# Patient Record
Sex: FEMALE | Race: WHITE | NOT HISPANIC OR LATINO | Employment: FULL TIME | URBAN - METROPOLITAN AREA
[De-identification: names, ages, dates, MRNs, and addresses within clinical notes are randomized per-mention and may not be internally consistent; named-entity substitution may affect disease eponyms.]

---

## 2024-10-18 ENCOUNTER — TELEPHONE (OUTPATIENT)
Dept: OBSTETRICS AND GYNECOLOGY | Facility: CLINIC | Age: 24
End: 2024-10-18

## 2024-10-18 NOTE — TELEPHONE ENCOUNTER
Caller: Edwina Suarez    Relationship: Self    Best call back number: [phone]      Who are you requesting to speak with (clinical staff, DR. LUEVANO      What was the call regarding: PATIENT ADVISED THAT SHE GOT A CALL FROM [email or web address] ( [phone]) WHICH WAS WHERE SHE HAD HER FIRST U/S DONE.  ADVISED HER THAT SHE HAS FLUID BUILD UP OUTSIDE OF HER UTERUS AND TO FORWARD THIS INFORMATION TO OBGYN.

## 2024-10-18 NOTE — TELEPHONE ENCOUNTER
Spoke with Edwina and asked if she has a copy of the ultrasound and she states she only received a call from [employer]. I called to get a copy of the report and they are closed. I told Edwina that once I receive the report I would send a message to the provider that is on call. Since they are not open and I am unable to get a copy of the report I am sending a message to Dr. Dane CHNE to see if there are any recommendations she can give without the u/s report.

## 2024-10-23 ENCOUNTER — TELEPHONE (OUTPATIENT)
Dept: OBSTETRICS AND GYNECOLOGY | Facility: CLINIC | Age: 24
End: 2024-10-23

## 2024-10-23 NOTE — TELEPHONE ENCOUNTER
Caller: Edwina Suarez    Relationship: Self    Best call back number: [phone]    What is the best time to reach you: ANY    Who are you requesting to speak with (clinical staff, provider,  specific staff member): CLINICAL     What was the call regarding: PT STATES SHE IS REQUESTING A CALL BACK TO DISCUSS HER U/S

## 2024-10-24 NOTE — TELEPHONE ENCOUNTER
Patient returned call and stated that she brought the US report into the office. Will get scanned into the chart.

## 2024-10-24 NOTE — TELEPHONE ENCOUNTER
Called [employer] and they can not release the US unless the patient goes in and signs a release or picks up the report herself. Tried to call patient and was disconnected.

## 2024-10-24 NOTE — TELEPHONE ENCOUNTER
Hub staff attempted to follow warm transfer process and was unsuccessful     Caller: Edwina Suarez    Relationship to patient: Self    Best call back number: [phone] CALL ANYTIME. IT IS OKAY TO LVM.     Patient is needing: OB PATIENT IS REQUESTING A CALL BACK TO DISCUSS ULTRASOUND FROM [facility]. PATIENT CALLED [employer] ON 10/21/24 AND REQUESTED RESULTS FAXED. PATIENT CAN NOT RECALL FAX NUMBER PROVIDED.     PATIENT IS GOING TO CALL [employer] TODAY TO CONFIRM FAX WENT THROUGH ON 10/21/24 AND REQUEST RE FAXED TO OFFICE [phone].     ADDITIONAL TELEPHONE ENCOUNTER IN CHART FROM 10/18/24 REGARDING ULTRASOUND CONCERNS.

## 2024-11-05 NOTE — PROGRESS NOTES
"GYN Visit    Chief Complaint   Patient presents with    Follow-up     Follow up viability US        HPI:   24 y.o. LMP: Patient's last menstrual period was 2024 (exact date).  Patient presents today for confirmation of pregnancy.  She has taken a prenatal vitamin.  She denies any bleeding or cramping.  She is having nausea and vomiting.  She is wanting medication to help.    Social History     Substance and Sexual Activity   Sexual Activity Yes    Partners: Male    Birth control/protection: None       History: PMHx, Meds, Allergies, PSHx, Social Hx, and POBHx all reviewed and updated.  Last Completed Pap Smear       This patient has no relevant Health Maintenance data.            PHYSICAL EXAM:  /67   Pulse 87   Ht 160 cm (63\")   Wt 64 kg (141 lb)   LMP 2024 (Exact Date)   Breastfeeding No   BMI 24.98 kg/m²   General- NAD, alert and oriented, appropriate  Psych- Normal mood  Respiratory- No labored breathing  Abdomen- Soft, non distended, non tender    Extremities- No edema  Skin- No lesions, no rashes      ASSESSMENT AND PLAN:  Diagnoses and all orders for this visit:    1. Pregnancy with inconclusive fetal viability, single or unspecified fetus (Primary)    2. Nausea and vomiting, unspecified vomiting type  -     vitamin B-6 (PYRIDOXINE) 25 MG tablet; Take 1 tablet by mouth Daily.  Dispense: 30 tablet; Refill: 1  -     doxylamine (UNISOM) 25 MG tablet; Take 1 tablet by mouth At Night As Needed for Nausea.  Dispense: 30 tablet; Refill: 1  -     promethazine (PHENERGAN) 25 MG tablet; Take 1 tablet by mouth Every 6 (Six) Hours As Needed for Nausea or Vomiting.  Dispense: 30 tablet; Refill: 1    Ultrasound reviewed showing single live intrauterine pregnancy at 11 weeks 1 day gestation, HELIO 2025  Continue prenatal vitamins  New OB visit in 3 weeks  FIRST TRIMESTER precautions provided- return to office/ER for pain and/or vaginal bleeding.      Follow Up:  Return in about 3 weeks " (around 11/27/2024) for New OB.        Alba Henao, DO  11/06/2024    [facility]  [address] [place] [address]  Dept: [phone]  Dept Fax: [phone]  Loc: [phone]  Loc Fax: [phone]

## 2024-11-06 ENCOUNTER — OFFICE VISIT (OUTPATIENT)
Dept: OBSTETRICS AND GYNECOLOGY | Facility: CLINIC | Age: 24
End: 2024-11-06
Payer: COMMERCIAL

## 2024-11-06 VITALS
HEIGHT: 63 IN | HEART RATE: 87 BPM | WEIGHT: 141 LBS | DIASTOLIC BLOOD PRESSURE: 67 MMHG | SYSTOLIC BLOOD PRESSURE: 109 MMHG | BODY MASS INDEX: 24.98 KG/M2

## 2024-11-06 DIAGNOSIS — R11.2 NAUSEA AND VOMITING, UNSPECIFIED VOMITING TYPE: ICD-10-CM

## 2024-11-06 DIAGNOSIS — O36.80X0 PREGNANCY WITH INCONCLUSIVE FETAL VIABILITY, SINGLE OR UNSPECIFIED FETUS: Primary | ICD-10-CM

## 2024-11-06 RX ORDER — DIPHENHYDRAMINE HYDROCHLORIDE 25 MG/1
25 CAPSULE ORAL DAILY
Qty: 30 TABLET | Refills: 1 | Status: SHIPPED | OUTPATIENT
Start: 2024-11-06

## 2024-11-06 RX ORDER — PROMETHAZINE HYDROCHLORIDE 25 MG/1
25 TABLET ORAL EVERY 6 HOURS PRN
Qty: 30 TABLET | Refills: 1 | Status: SHIPPED | OUTPATIENT
Start: 2024-11-06

## 2024-12-12 ENCOUNTER — INITIAL PRENATAL (OUTPATIENT)
Dept: OBSTETRICS AND GYNECOLOGY | Facility: CLINIC | Age: 24
End: 2024-12-12
Payer: COMMERCIAL

## 2024-12-12 VITALS — WEIGHT: 139 LBS | SYSTOLIC BLOOD PRESSURE: 90 MMHG | BODY MASS INDEX: 24.62 KG/M2 | DIASTOLIC BLOOD PRESSURE: 58 MMHG

## 2024-12-12 DIAGNOSIS — O99.330 TOBACCO USE DURING PREGNANCY, ANTEPARTUM: ICD-10-CM

## 2024-12-12 DIAGNOSIS — Z34.80 SUPERVISION OF OTHER NORMAL PREGNANCY, ANTEPARTUM: Primary | ICD-10-CM

## 2024-12-12 DIAGNOSIS — Z98.891 H/O CESAREAN SECTION: ICD-10-CM

## 2024-12-12 PROBLEM — O03.9 SAB (SPONTANEOUS ABORTION): Status: RESOLVED | Noted: 2024-04-25 | Resolved: 2024-12-12

## 2024-12-12 LAB
ABO GROUP BLD: NORMAL
AMPHET+METHAMPHET UR QL: NEGATIVE
AMPHETAMINES UR QL: NEGATIVE
BARBITURATES UR QL SCN: NEGATIVE
BASOPHILS # BLD AUTO: 0.03 10*3/MM3 (ref 0–0.2)
BASOPHILS NFR BLD AUTO: 0.3 % (ref 0–1.5)
BENZODIAZ UR QL SCN: NEGATIVE
BLD GP AB SCN SERPL QL: NEGATIVE
BUPRENORPHINE SERPL-MCNC: NEGATIVE NG/ML
C TRACH RRNA CVX QL NAA+PROBE: NOT DETECTED
CANNABINOIDS SERPL QL: POSITIVE
COCAINE UR QL: NEGATIVE
DEPRECATED RDW RBC AUTO: 39.4 FL (ref 37–54)
EOSINOPHIL # BLD AUTO: 0.36 10*3/MM3 (ref 0–0.4)
EOSINOPHIL NFR BLD AUTO: 3.1 % (ref 0.3–6.2)
ERYTHROCYTE [DISTWIDTH] IN BLOOD BY AUTOMATED COUNT: 11.8 % (ref 12.3–15.4)
FENTANYL UR-MCNC: NEGATIVE NG/ML
GLUCOSE UR STRIP-MCNC: NEGATIVE MG/DL
HBA1C MFR BLD: 4.9 % (ref 4.8–5.6)
HBV SURFACE AG SERPL QL IA: NORMAL
HCT VFR BLD AUTO: 36.5 % (ref 34–46.6)
HCV AB SER QL: NORMAL
HGB BLD-MCNC: 12.3 G/DL (ref 12–15.9)
HIV 1+2 AB+HIV1 P24 AG SERPL QL IA: NORMAL
IMM GRANULOCYTES # BLD AUTO: 0.06 10*3/MM3 (ref 0–0.05)
IMM GRANULOCYTES NFR BLD AUTO: 0.5 % (ref 0–0.5)
LYMPHOCYTES # BLD AUTO: 3.26 10*3/MM3 (ref 0.7–3.1)
LYMPHOCYTES NFR BLD AUTO: 27.7 % (ref 19.6–45.3)
MCH RBC QN AUTO: 30.9 PG (ref 26.6–33)
MCHC RBC AUTO-ENTMCNC: 33.7 G/DL (ref 31.5–35.7)
MCV RBC AUTO: 91.7 FL (ref 79–97)
METHADONE UR QL SCN: NEGATIVE
MONOCYTES # BLD AUTO: 0.66 10*3/MM3 (ref 0.1–0.9)
MONOCYTES NFR BLD AUTO: 5.6 % (ref 5–12)
N GONORRHOEA RRNA SPEC QL NAA+PROBE: NOT DETECTED
NEUTROPHILS NFR BLD AUTO: 62.8 % (ref 42.7–76)
NEUTROPHILS NFR BLD AUTO: 7.4 10*3/MM3 (ref 1.7–7)
NRBC BLD AUTO-RTO: 0 /100 WBC (ref 0–0.2)
OPIATES UR QL: NEGATIVE
OXYCODONE UR QL SCN: NEGATIVE
PCP UR QL SCN: NEGATIVE
PLATELET # BLD AUTO: 265 10*3/MM3 (ref 140–450)
PMV BLD AUTO: 10.3 FL (ref 6–12)
PROT UR STRIP-MCNC: NEGATIVE MG/DL
RBC # BLD AUTO: 3.98 10*6/MM3 (ref 3.77–5.28)
RH BLD: POSITIVE
TRICYCLICS UR QL SCN: NEGATIVE
WBC NRBC COR # BLD AUTO: 11.77 10*3/MM3 (ref 3.4–10.8)

## 2024-12-12 PROCEDURE — 86780 TREPONEMA PALLIDUM: CPT | Performed by: STUDENT IN AN ORGANIZED HEALTH CARE EDUCATION/TRAINING PROGRAM

## 2024-12-12 PROCEDURE — 86593 SYPHILIS TEST NON-TREP QUANT: CPT | Performed by: STUDENT IN AN ORGANIZED HEALTH CARE EDUCATION/TRAINING PROGRAM

## 2024-12-12 PROCEDURE — 86803 HEPATITIS C AB TEST: CPT | Performed by: STUDENT IN AN ORGANIZED HEALTH CARE EDUCATION/TRAINING PROGRAM

## 2024-12-12 PROCEDURE — 87591 N.GONORRHOEAE DNA AMP PROB: CPT | Performed by: STUDENT IN AN ORGANIZED HEALTH CARE EDUCATION/TRAINING PROGRAM

## 2024-12-12 PROCEDURE — 87088 URINE BACTERIA CULTURE: CPT | Performed by: STUDENT IN AN ORGANIZED HEALTH CARE EDUCATION/TRAINING PROGRAM

## 2024-12-12 PROCEDURE — 80307 DRUG TEST PRSMV CHEM ANLYZR: CPT | Performed by: STUDENT IN AN ORGANIZED HEALTH CARE EDUCATION/TRAINING PROGRAM

## 2024-12-12 PROCEDURE — 80081 OBSTETRIC PANEL INC HIV TSTG: CPT | Performed by: STUDENT IN AN ORGANIZED HEALTH CARE EDUCATION/TRAINING PROGRAM

## 2024-12-12 PROCEDURE — 87491 CHLMYD TRACH DNA AMP PROBE: CPT | Performed by: STUDENT IN AN ORGANIZED HEALTH CARE EDUCATION/TRAINING PROGRAM

## 2024-12-12 PROCEDURE — 87086 URINE CULTURE/COLONY COUNT: CPT | Performed by: STUDENT IN AN ORGANIZED HEALTH CARE EDUCATION/TRAINING PROGRAM

## 2024-12-12 PROCEDURE — 87186 SC STD MICRODIL/AGAR DIL: CPT | Performed by: STUDENT IN AN ORGANIZED HEALTH CARE EDUCATION/TRAINING PROGRAM

## 2024-12-12 PROCEDURE — 83036 HEMOGLOBIN GLYCOSYLATED A1C: CPT | Performed by: STUDENT IN AN ORGANIZED HEALTH CARE EDUCATION/TRAINING PROGRAM

## 2024-12-12 PROCEDURE — 83020 HEMOGLOBIN ELECTROPHORESIS: CPT | Performed by: STUDENT IN AN ORGANIZED HEALTH CARE EDUCATION/TRAINING PROGRAM

## 2024-12-12 NOTE — PROGRESS NOTES
NEW OBSTETRIC HISTORY AND PHYSICAL     Subjective:  Edwina Disla is a 24 y.o.  at 16w2d  here for her new OB visit. Patient pregnancy is dated by a LMP and 11wk US. She is taking her prenatal vitamins.Reports no loss of fluid or vaginal bleeding.No hx of genetic, bleeding, endocrine, chromosome disorder in both patient and partner. No history of multiple gestations, congenital anomalies or mental retardation.      History of Present Illness  The patient is a 24-year-old female who presents today for a new OB visit.    She reports an overall improvement in her condition, with a significant reduction in nausea. She has no other medical conditions and is not on any daily medications, except for those prescribed for nausea. She is currently taking prenatal vitamins and reports no instances of bleeding or cramping. She has expressed interest in genetic testing. This is her fourth pregnancy, with a history of one miscarriage. Her previous pregnancies were delivered via . The first pregnancy was complicated by hypertension, necessitating an emergency  due to fetal distress. The second pregnancy was uneventful.    MEDICATIONS  Current: prenatal vitamins      Discussed adequate water intake, food guidelines/weight gain, limit caffiene to less than 200mg daily.   Discussed food, activities to avoid. Discussed seatbelt safety.   Reviewed safe meds in pregnancy handout.  Taking PNV: yes  Smoking cessation needed: yes    Reviewed and updated:  OBHx, GYNHx (STDs), PMHx, Medications, Allergies, PSHx, Social Hx, Preventative Hx (PAP), Hx of abuse/safe environment, Vaccine Hx including hx of chickenpox or vaccine, Genetic Hx (pt, FOB, both families).        OB History    Para Term  AB Living   4 2 2   1 2   SAB IAB Ectopic Molar Multiple Live Births   1         2      # Outcome Date GA Lbr Hakan/2nd Weight Sex Type Anes PTL Lv   4 Current            3 SAB 24     SAB      2 Term  20 38w0d  3062 g (6 lb 12 oz) M CS-LTranv  N ALVA   1 Term 18 40w0d  3118 g (6 lb 14 oz) F CS-LTranv  N ALVA     Past Medical History:   Diagnosis Date    Abnormal Pap smear of cervix      Past Surgical History:   Procedure Laterality Date     SECTION      DENTAL PROCEDURE       Family History   Problem Relation Age of Onset    Breast cancer Neg Hx     Uterine cancer Neg Hx     Colon cancer Neg Hx     Ovarian cancer Neg Hx     Melanoma Neg Hx     Prostate cancer Neg Hx      No Known Allergies  Social History     Socioeconomic History    Marital status:    Tobacco Use    Smoking status: Every Day     Current packs/day: 1.00     Types: Cigarettes     Passive exposure: Current    Smokeless tobacco: Never   Vaping Use    Vaping status: Never Used   Substance and Sexual Activity    Alcohol use: Not Currently     Comment: Occasional    Drug use: Never    Sexual activity: Yes     Partners: Male     Birth control/protection: None     Last Completed Pap Smear       This patient has no relevant Health Maintenance data.            ROS:   General ROS: negative for - chills or fatigue  Gastrointestinal ROS: negative for - abdominal pain or appetite loss    Objective:  Physical Exam:    Vitals:    24 0952   BP: 90/58       General appearance - alert, well appearing, and in no distress  Respiratory- No labored breathing  Breasts- Deferred to annual  Abdomen- Soft, Gravid uterus, non-tender  Extremeties: Normal ROM, Negative swelling         Counseling:   Nutrition discussed, calories, activity/exercise in pregnancy  Discussed dietary restrictions/safety food preparation in pregnancy  Reviewed what to expect prenatal visits, office providers (female and male) and covering [facility] Hospitalists/Dr. Jade  Appropriate trimester precautions provided, N/V, vag bleeding, cramping  Questions answered  Discussed healthy weight gain.  Also discussed increased water intake, 200mg of caffeine daily, exercise and  healthy eating habits.  Encouraged patient to consider breastfeeding. Discussed that it is recommended by the CDC and WHO that women exclusively breastfeed for the first 6 months and continue to breastfeed up to one year. Discussed the health benefits of breastfeeding, including increased immune systems in infants, reduced risk of food allergies, and reduced risk of chronic disease as an adult. Maternal benefits include more PP weight loss, reduced risk of PP depression, breast/ovarian cancer risk reduced.     ASSESSMENT/PLAN:   Diagnoses and all orders for this visit:    1. Supervision of other normal pregnancy, antepartum (Primary)  Assessment & Plan:  HELIO finalized: 2025 by LMP, 11wk US and ACOG    Genetic testing (NIPS-Quad)/CF/AFP:  ordered    COVID: recommended  Flu: recommended  Tdap: 27-36 weeks  RSV: 32-36 weeks    Repeat TPA at 28-32 weeks  1hr gtt:     Rhogam: Apos  ?Sterilization:    EPDS:     Anatomy US: ordered  FU US:    PROBLEM LIST/PLAN:   Hx of C/S x2- recommend repeat  Hx of HTN in G1- baseline labs ordered  Tobacco use- cessation advised    Orders:  -     POC Urinalysis Dipstick  -     Urine Drug Screen - Urine, Clean Catch; Future  -     Urine Culture - Urine, Urine, Clean Catch  -     Chlamydia trachomatis, Neisseria gonorrhoeae, PCR - , Urine, Random Void  -     OB Panel With HIV and RPR  -     Hemoglobin A1c; Future  -     Hemoglobinopathy Fractionation [place]  -     Bess Panorama Prenatal Test: Chromosomes 13, 18, 21, X & Y: Triploidy 22Q.11.2 Deletion - Blood,; Future  -     Bess Horizon 2(CF & SMA) - Blood,; Future  -     US Ob 14 + Weeks Single or First Gestation; Future  -     Comprehensive Metabolic Panel  -     Hemoglobin A1c  -     Bess Panorama Prenatal Test: Chromosomes 13, 18, 21, X & Y: Triploidy 22Q.11.2 Deletion - Blood,  -     Bess Horizon 2(CF & SMA) - Blood,    2. H/O  section    3. Tobacco use during pregnancy, antepartum          Assessment & Plan  1.  New obstetric visit.  She is currently pregnant with her fourth child, having previously experienced one miscarriage. Her two prior pregnancies resulted in  sections. She reports a decrease in nausea and is not experiencing any bleeding or cramping. She is taking prenatal vitamins and medication for nausea. A comprehensive blood work panel will be conducted today, including blood type, CBC, HIV, syphilis, hepatitis, and A1c. An anatomy scan is scheduled for her next visit, which will coincide with the 20th week of her pregnancy. A  section is planned for the delivery of this baby.    Follow-up  The patient will follow up in 4 weeks.    PROCEDURE  The patient has a history of two prior  sections.      Return in about 4 weeks (around 2025) for Routine OB visit.    We have gone over the expected prenatal care to include the timing and content of visits including the anatomy ultrasound.  We discussed the content of the anatomy ultrasound and its limitations (the fact that ultrasound in general may not see up to 40% of abnormalities).  I informed her how to contact the office and/or on call person in the event of any problems and encouraged her to do so when she feels it is necessary.  We then spent time answering her questions which she indicated were answered to her satisfaction.      Patient or patient representative verbalized consent for the use of Ambient Listening during the visit with  Alba Henao DO for chart documentation. 2024  10:13 CAT Henao DO  2024 10:17 CAT

## 2024-12-12 NOTE — ASSESSMENT & PLAN NOTE
HELIO finalized: 5/27/2025 by LMP, 11wk US and ACOG    Genetic testing (NIPS-Quad)/CF/AFP:  ordered    COVID: recommended  Flu: recommended  Tdap: 27-36 weeks  RSV: 32-36 weeks    Repeat TPA at 28-32 weeks  1hr gtt:     Rhogam: Apos  ?Sterilization:    EPDS:     Anatomy US: ordered  FU US:    PROBLEM LIST/PLAN:   Hx of C/S x2- recommend repeat  Hx of HTN in G1- baseline labs ordered  Tobacco use- cessation advised

## 2024-12-13 LAB
RPR SER QL: ABNORMAL
RPR SER-TITR: ABNORMAL {TITER}

## 2024-12-14 LAB
BACTERIA SPEC AEROBE CULT: ABNORMAL
RUBV IGG SERPL IA-ACNC: 1.98 INDEX

## 2024-12-14 RX ORDER — NITROFURANTOIN 25; 75 MG/1; MG/1
100 CAPSULE ORAL 2 TIMES DAILY
Qty: 14 CAPSULE | Refills: 0 | Status: SHIPPED | OUTPATIENT
Start: 2024-12-14

## 2024-12-16 ENCOUNTER — REFERRAL TRIAGE (OUTPATIENT)
Dept: LABOR AND DELIVERY | Facility: HOSPITAL | Age: 24
End: 2024-12-16
Payer: COMMERCIAL

## 2024-12-16 LAB
HGB A MFR BLD ELPH: 97.3 % (ref 96.4–98.8)
HGB A2 MFR BLD ELPH: 2.7 % (ref 1.8–3.2)
HGB F MFR BLD ELPH: 0 % (ref 0–2)
HGB FRACT BLD-IMP: NORMAL
HGB S MFR BLD ELPH: 0 %
TREPONEMA PALLIDUM IGG+IGM AB [PRESENCE] IN SERUM OR PLASMA BY IMMUNOASSAY: REACTIVE

## 2024-12-18 DIAGNOSIS — O98.119 SYPHILIS OF MOTHER DURING PREGNANCY: Primary | ICD-10-CM

## 2024-12-19 ENCOUNTER — HOSPITAL ENCOUNTER (OUTPATIENT)
Dept: INFUSION THERAPY | Facility: HOSPITAL | Age: 24
Discharge: HOME | End: 2024-12-19
Payer: COMMERCIAL

## 2024-12-19 ENCOUNTER — TELEPHONE (OUTPATIENT)
Dept: OBSTETRICS AND GYNECOLOGY | Facility: CLINIC | Age: 24
End: 2024-12-19
Payer: COMMERCIAL

## 2024-12-19 VITALS
WEIGHT: 141.98 LBS | BODY MASS INDEX: 25.16 KG/M2 | SYSTOLIC BLOOD PRESSURE: 124 MMHG | RESPIRATION RATE: 20 BRPM | OXYGEN SATURATION: 99 % | HEIGHT: 63 IN | TEMPERATURE: 98.2 F | HEART RATE: 93 BPM | DIASTOLIC BLOOD PRESSURE: 40 MMHG

## 2024-12-19 DIAGNOSIS — O98.119 SYPHILIS OF MOTHER DURING PREGNANCY: Primary | ICD-10-CM

## 2024-12-19 PROCEDURE — 25010000002 PENICILLIN G BENZATHINE PER 2400000 UNITS: Performed by: STUDENT IN AN ORGANIZED HEALTH CARE EDUCATION/TRAINING PROGRAM

## 2024-12-19 PROCEDURE — 96372 THER/PROPH/DIAG INJ SC/IM: CPT

## 2024-12-19 RX ADMIN — PENICILLIN G BENZATHINE 2.4 MILLION UNITS: 2400000 INJECTION, SUSPENSION INTRAMUSCULAR at 17:11

## 2024-12-19 NOTE — TELEPHONE ENCOUNTER
Patient called stating [facility] and is requesting recommendations/treatment. Please advise.

## 2024-12-19 NOTE — TELEPHONE ENCOUNTER
Patient called back, discussed results and recommendations with her. All questions were answered and patient verbalizes her understanding of the importance of completing the treatment series. First injection is scheduled for today at 5pm.

## 2024-12-25 PROBLEM — O23.40 UTI (URINARY TRACT INFECTION) DURING PREGNANCY: Status: ACTIVE | Noted: 2024-12-25

## 2024-12-26 ENCOUNTER — HOSPITAL ENCOUNTER (OUTPATIENT)
Dept: INFUSION THERAPY | Facility: HOSPITAL | Age: 24
Discharge: HOME | End: 2024-12-26
Admitting: STUDENT IN AN ORGANIZED HEALTH CARE EDUCATION/TRAINING PROGRAM
Payer: COMMERCIAL

## 2024-12-26 VITALS
DIASTOLIC BLOOD PRESSURE: 50 MMHG | HEART RATE: 77 BPM | OXYGEN SATURATION: 99 % | RESPIRATION RATE: 18 BRPM | TEMPERATURE: 97.9 F | SYSTOLIC BLOOD PRESSURE: 103 MMHG

## 2024-12-26 DIAGNOSIS — O98.119 SYPHILIS OF MOTHER DURING PREGNANCY: Primary | ICD-10-CM

## 2024-12-26 LAB
Lab: NEGATIVE
Lab: NORMAL
Lab: NORMAL
NTRA CYSTIC FIBROSIS: NEGATIVE
NTRA SPINAL MUSCULAR ATROPHY: NEGATIVE

## 2024-12-26 PROCEDURE — 25010000002 PENICILLIN G BENZATHINE PER 1200000 UNITS: Performed by: STUDENT IN AN ORGANIZED HEALTH CARE EDUCATION/TRAINING PROGRAM

## 2024-12-26 PROCEDURE — 96372 THER/PROPH/DIAG INJ SC/IM: CPT

## 2024-12-26 RX ADMIN — PENICILLIN G BENZATHINE 2.4 MILLION UNITS: 1200000 INJECTION, SUSPENSION INTRAMUSCULAR at 17:03

## 2025-01-02 ENCOUNTER — HOSPITAL ENCOUNTER (OUTPATIENT)
Dept: INFUSION THERAPY | Facility: HOSPITAL | Age: 25
Discharge: HOME | End: 2025-01-02
Admitting: STUDENT IN AN ORGANIZED HEALTH CARE EDUCATION/TRAINING PROGRAM
Payer: COMMERCIAL

## 2025-01-02 VITALS
SYSTOLIC BLOOD PRESSURE: 116 MMHG | TEMPERATURE: 97.4 F | OXYGEN SATURATION: 99 % | RESPIRATION RATE: 20 BRPM | DIASTOLIC BLOOD PRESSURE: 49 MMHG | HEART RATE: 74 BPM

## 2025-01-02 DIAGNOSIS — O98.119 SYPHILIS OF MOTHER DURING PREGNANCY: Primary | ICD-10-CM

## 2025-01-02 PROCEDURE — 96372 THER/PROPH/DIAG INJ SC/IM: CPT

## 2025-01-02 PROCEDURE — 25010000002 PENICILLIN G BENZATHINE PER 1200000 UNITS: Performed by: STUDENT IN AN ORGANIZED HEALTH CARE EDUCATION/TRAINING PROGRAM

## 2025-01-02 RX ADMIN — PENICILLIN G BENZATHINE 2.4 MILLION UNITS: 1200000 INJECTION, SUSPENSION INTRAMUSCULAR at 07:00

## 2025-01-09 ENCOUNTER — TELEPHONE (OUTPATIENT)
Dept: OBSTETRICS AND GYNECOLOGY | Facility: CLINIC | Age: 25
End: 2025-01-09
Payer: COMMERCIAL

## 2025-01-09 NOTE — PROGRESS NOTES
Routine Prenatal Visit     Subjective  Edwina Disla is a 24 y.o.  at 21w0d here for her routine OB visit.   She is taking her prenatal vitamins.Reports no loss of fluid or vaginal bleeding. Patient doing well. Still having nausea and vomiting.    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    UTI (urinary tract infection) during pregnancy         OB History    Para Term  AB Living   4 2 2   1 2   SAB IAB Ectopic Molar Multiple Live Births   1         2      # Outcome Date GA Lbr Hakan/2nd Weight Sex Type Anes PTL Lv   4 Current            3 SAB 24     SAB      2 Term 20 38w0d  3062 g (6 lb 12 oz) M CS-LTranv  N ALVA   1 Term 18 40w0d  3118 g (6 lb 14 oz) F CS-LTranv  N ALVA           ROS:   General ROS: negative for - chills or fatigue  Genito-Urinary ROS: negative for  change in urinary stream, vaginal discharge     Objective  Physical Exam:   Vitals:    25 0959   BP: 112/68       Uterine Size: not examined, US today  FHT: 110-160 BPM         Assessment/Plan:   Diagnoses and all orders for this visit:    1. Supervision of other normal pregnancy, antepartum (Primary)  Assessment & Plan:  HELIO finalized: 2025 by LMP, 11wk US and ACOG    Genetic testing (NIPS-Quad)/CF/AFP:  NIPS neg XY, CF/SMA neg    COVID: recommended  Flu: recommended  Tdap: 27-36 weeks  RSV: 32-36 weeks    Repeat TPA at 28-32 weeks  1hr gtt:     Rhogam: Apos  ?Sterilization:    EPDS:     Anatomy US:  EFW 45%, AC 38%, cephalic, anterior grade 1 placenta, normal anatomy, left renal dilation 6 mm, limited profile  FU US: ordered    PROBLEM LIST/PLAN:   Hx of C/S x2- recommend repeat  Hx of HTN in G1- baseline labs ordered  Tobacco use- cessation advised  Syphilis- RPR titer 1:1  >>Penicillin therapy plan  placed and completed  >>confirmation testing ordered 25    Orders:  -     POC Urinalysis Dipstick  -     hCG, Quantitative, Pregnancy  -     Urine Culture - Urine, Urine, Clean Catch  -     US Ob 14 + Weeks Single or First Gestation; Future    2. 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 H/O  section  -     POC Urinalysis Dipstick    4. Tobacco use during pregnancy, antepartum  -     POC Urinalysis Dipstick    5. Nausea and vomiting, unspecified vomiting type  -     POC Urinalysis Dipstick  -     ondansetron (Zofran) 4 MG tablet; Take 1 tablet by mouth Every 8 (Eight) Hours As Needed for Vomiting or Nausea for up to 30 days.  Dispense: 30 tablet; Refill: 1          Counseling:   OB precautions, leaking, VB, dominique courtney vs PTL/Labor  Round Ligament Pain:  The uterus has several ligaments which provide support and keep the uterus in place. As the  uterus grows these ligaments are pulled and stretched which often causes sharp stabbing like pain in the inguinal area.   You may find a pregnancy support band helpful. Changing positions may also help. Yoga is a great way to cope with round ligament and low back pain in pregnancy.    Massage may also help with low back pain   Things to Consider at this Point in your Pregnancy:  Some women experience swelling in their feet during pregnancy. Compression stockings may help  Drink plenty of water and stay active   Make sure you are eating frequent small meals, nuts are a wonderful snack to keep with you            Return in about 4 weeks (around 2025) for Routine OB visit.      We have gone over prenatal care to include the timing and content of visits. I informed her how to contact the office and/or on call person in the event of any problems and encouraged her to do so when she feels it is necessary.  We then spent time answering her questions  which she indicated were answered to her satisfaction.    Alba Henao,   1/14/2025 12:43 EST

## 2025-01-09 NOTE — TELEPHONE ENCOUNTER
I received a call from Yelitza Martinez with [facility] regarding reported lab results.  Spouse had confirmatory testing on 12/26/24, TP-EIA which was non-reactive.  This was done through the state lab. He was treated with BIC 2.4 million units on 12/26/2024.

## 2025-01-13 ENCOUNTER — TELEPHONE (OUTPATIENT)
Dept: OBSTETRICS AND GYNECOLOGY | Facility: CLINIC | Age: 25
End: 2025-01-13
Payer: COMMERCIAL

## 2025-01-14 ENCOUNTER — ROUTINE PRENATAL (OUTPATIENT)
Dept: OBSTETRICS AND GYNECOLOGY | Facility: CLINIC | Age: 25
End: 2025-01-14
Payer: COMMERCIAL

## 2025-01-14 ENCOUNTER — PATIENT OUTREACH (OUTPATIENT)
Dept: LABOR AND DELIVERY | Facility: HOSPITAL | Age: 25
End: 2025-01-14
Payer: COMMERCIAL

## 2025-01-14 VITALS — BODY MASS INDEX: 25.33 KG/M2 | WEIGHT: 143 LBS | SYSTOLIC BLOOD PRESSURE: 112 MMHG | DIASTOLIC BLOOD PRESSURE: 68 MMHG

## 2025-01-14 DIAGNOSIS — O98.119 SYPHILIS OF MOTHER DURING PREGNANCY: ICD-10-CM

## 2025-01-14 DIAGNOSIS — Z98.891 H/O CESAREAN SECTION: ICD-10-CM

## 2025-01-14 DIAGNOSIS — O99.330 TOBACCO USE DURING PREGNANCY, ANTEPARTUM: ICD-10-CM

## 2025-01-14 DIAGNOSIS — R11.2 NAUSEA AND VOMITING, UNSPECIFIED VOMITING TYPE: ICD-10-CM

## 2025-01-14 DIAGNOSIS — Z34.80 SUPERVISION OF OTHER NORMAL PREGNANCY, ANTEPARTUM: Primary | ICD-10-CM

## 2025-01-14 LAB
ALBUMIN SERPL-MCNC: 3.9 G/DL (ref 3.5–5.2)
ALBUMIN/GLOB SERPL: 1.3 G/DL
ALP SERPL-CCNC: 60 U/L (ref 39–117)
ALT SERPL W P-5'-P-CCNC: 8 U/L (ref 1–33)
ANION GAP SERPL CALCULATED.3IONS-SCNC: 12.1 MMOL/L (ref 5–15)
AST SERPL-CCNC: 17 U/L (ref 1–32)
BILIRUB SERPL-MCNC: <0.2 MG/DL (ref 0–1.2)
BUN SERPL-MCNC: 5 MG/DL (ref 6–20)
BUN/CREAT SERPL: 13.2 (ref 7–25)
CALCIUM SPEC-SCNC: 9.6 MG/DL (ref 8.6–10.5)
CHLORIDE SERPL-SCNC: 105 MMOL/L (ref 98–107)
CO2 SERPL-SCNC: 20.9 MMOL/L (ref 22–29)
CREAT SERPL-MCNC: 0.38 MG/DL (ref 0.57–1)
EGFRCR SERPLBLD CKD-EPI 2021: 143.7 ML/MIN/1.73
GLOBULIN UR ELPH-MCNC: 3.1 GM/DL
GLUCOSE SERPL-MCNC: 72 MG/DL (ref 65–99)
GLUCOSE UR STRIP-MCNC: NEGATIVE MG/DL
HCG INTACT+B SERPL-ACNC: NORMAL MIU/ML
POTASSIUM SERPL-SCNC: 3.6 MMOL/L (ref 3.5–5.2)
PROT SERPL-MCNC: 7 G/DL (ref 6–8.5)
PROT UR STRIP-MCNC: NEGATIVE MG/DL
SODIUM SERPL-SCNC: 138 MMOL/L (ref 136–145)

## 2025-01-14 PROCEDURE — 84702 CHORIONIC GONADOTROPIN TEST: CPT | Performed by: NURSE PRACTITIONER

## 2025-01-14 PROCEDURE — 80053 COMPREHEN METABOLIC PANEL: CPT | Performed by: STUDENT IN AN ORGANIZED HEALTH CARE EDUCATION/TRAINING PROGRAM

## 2025-01-14 PROCEDURE — 87086 URINE CULTURE/COLONY COUNT: CPT | Performed by: STUDENT IN AN ORGANIZED HEALTH CARE EDUCATION/TRAINING PROGRAM

## 2025-01-14 RX ORDER — PRENATAL VIT NO.126/IRON/FOLIC 28MG-0.8MG
TABLET ORAL DAILY
COMMUNITY

## 2025-01-14 RX ORDER — ONDANSETRON 4 MG/1
4 TABLET, FILM COATED ORAL EVERY 8 HOURS PRN
Qty: 30 TABLET | Refills: 1 | Status: SHIPPED | OUTPATIENT
Start: 2025-01-14 | End: 2025-02-13

## 2025-01-14 NOTE — OUTREACH NOTE
[facility]  Enrollment    Current Estimated Gestational Age: 21w0d    Questions/Answers      Flowsheet Row Responses   Would like to participate? Yes   Date of Intake Visit 01/14/25        [facility]  Intake    Current Estimated Gestational Age: 21w0d    Intake Assessment      Flowsheet Row Responses   Best Method for Contacting Cell   Currently Employed Yes   Able to keep appointments as scheduled Yes   Gender(s) and Name(s) Boy   Resources Presently Utilizing: WIC (Women, Infant, Children)   Maternal Warning Signs Provided   Other: Provided   Other Education WIC Benefits, Insurance benefits/Incentives, HANDS, How to find a pediatrician            Learning Assessment      Flowsheet Row Responses   Relationship Patient, Significant Other   Does the learner have any barriers to learning? No Barriers   What is the preferred language of the learner for medical teaching? English   Is an  required? No            Pediatrician: Renata BARRETT: Surinder  Feeding Plan: breast feeding    No current concerns with food, housing or transportation.  Encouraged to reach out for any questions, needs or concerns.    Tobacco, Alcohol, and Drug History     reports that she has been smoking cigarettes. She has been exposed to tobacco smoke. She has never used smokeless tobacco.   reports that she does not currently use alcohol.   reports no history of drug use.    Mile Romero RN  Maternity Nurse Navigator    1/14/2025, 10:14 EST

## 2025-01-14 NOTE — ASSESSMENT & PLAN NOTE
HELIO finalized: 5/27/2025 by LMP, 11wk US and ACOG    Genetic testing (NIPS-Quad)/CF/AFP:  NIPS neg XY, CF/SMA neg    COVID: recommended  Flu: recommended  Tdap: 27-36 weeks  RSV: 32-36 weeks    Repeat TPA at 28-32 weeks  1hr gtt:     Rhogam: Apos  ?Sterilization:    EPDS:     Anatomy US: 1/14 EFW 45%, AC 38%, cephalic, anterior grade 1 placenta, normal anatomy, left renal dilation 6 mm, limited profile  FU US: ordered    PROBLEM LIST/PLAN:   Hx of C/S x2- recommend repeat  Hx of HTN in G1- baseline labs ordered  Tobacco use- cessation advised  Syphilis- RPR titer 1:1  >>Penicillin therapy plan placed and completed  >>confirmation testing ordered 01/14/25

## 2025-01-16 LAB — BACTERIA SPEC AEROBE CULT: NO GROWTH

## 2025-01-17 LAB — T PALLIDUM AB SER QL AGGL: REACTIVE

## 2025-01-21 ENCOUNTER — PATIENT OUTREACH (OUTPATIENT)
Dept: LABOR AND DELIVERY | Facility: HOSPITAL | Age: 25
End: 2025-01-21
Payer: COMMERCIAL

## 2025-01-21 NOTE — OUTREACH NOTE
[facility]    [facility] second trimester information sent.    Mile Romero RN  Maternity Nurse Navigator    1/21/2025, 14:40 EST

## 2025-02-04 ENCOUNTER — PATIENT MESSAGE (OUTPATIENT)
Dept: OBSTETRICS AND GYNECOLOGY | Facility: CLINIC | Age: 25
End: 2025-02-04

## 2025-02-11 ENCOUNTER — ROUTINE PRENATAL (OUTPATIENT)
Dept: OBSTETRICS AND GYNECOLOGY | Facility: CLINIC | Age: 25
End: 2025-02-11
Payer: COMMERCIAL

## 2025-02-11 ENCOUNTER — TELEPHONE (OUTPATIENT)
Dept: OBSTETRICS AND GYNECOLOGY | Facility: CLINIC | Age: 25
End: 2025-02-11

## 2025-02-11 VITALS — BODY MASS INDEX: 25.15 KG/M2 | DIASTOLIC BLOOD PRESSURE: 60 MMHG | SYSTOLIC BLOOD PRESSURE: 102 MMHG | WEIGHT: 142 LBS

## 2025-02-11 DIAGNOSIS — O98.119 SYPHILIS OF MOTHER DURING PREGNANCY: ICD-10-CM

## 2025-02-11 DIAGNOSIS — Z98.891 H/O CESAREAN SECTION: ICD-10-CM

## 2025-02-11 DIAGNOSIS — O99.330 TOBACCO USE DURING PREGNANCY, ANTEPARTUM: ICD-10-CM

## 2025-02-11 DIAGNOSIS — Z34.80 SUPERVISION OF OTHER NORMAL PREGNANCY, ANTEPARTUM: Primary | ICD-10-CM

## 2025-02-11 LAB
DEPRECATED RDW RBC AUTO: 43.8 FL (ref 37–54)
ERYTHROCYTE [DISTWIDTH] IN BLOOD BY AUTOMATED COUNT: 13 % (ref 12.3–15.4)
GLUCOSE UR STRIP-MCNC: NEGATIVE MG/DL
HCT VFR BLD AUTO: 35.1 % (ref 34–46.6)
HGB BLD-MCNC: 11.9 G/DL (ref 12–15.9)
MCH RBC QN AUTO: 31.6 PG (ref 26.6–33)
MCHC RBC AUTO-ENTMCNC: 33.9 G/DL (ref 31.5–35.7)
MCV RBC AUTO: 93.1 FL (ref 79–97)
PLATELET # BLD AUTO: 252 10*3/MM3 (ref 140–450)
PMV BLD AUTO: 10.7 FL (ref 6–12)
PROT UR STRIP-MCNC: NEGATIVE MG/DL
RBC # BLD AUTO: 3.77 10*6/MM3 (ref 3.77–5.28)
WBC NRBC COR # BLD AUTO: 12.38 10*3/MM3 (ref 3.4–10.8)

## 2025-02-11 PROCEDURE — 86593 SYPHILIS TEST NON-TREP QUANT: CPT | Performed by: STUDENT IN AN ORGANIZED HEALTH CARE EDUCATION/TRAINING PROGRAM

## 2025-02-11 PROCEDURE — 85027 COMPLETE CBC AUTOMATED: CPT | Performed by: STUDENT IN AN ORGANIZED HEALTH CARE EDUCATION/TRAINING PROGRAM

## 2025-02-11 RX ORDER — LANCETS 30 GAUGE
120 EACH MISCELLANEOUS 4 TIMES DAILY
Qty: 120 EACH | Refills: 3 | Status: SHIPPED | OUTPATIENT
Start: 2025-02-11 | End: 2025-02-11

## 2025-02-11 RX ORDER — BLOOD PRESSURE TEST KIT
KIT MISCELLANEOUS
Qty: 120 EACH | Refills: 3 | Status: SHIPPED | OUTPATIENT
Start: 2025-02-11 | End: 2025-02-11

## 2025-02-11 RX ORDER — BLOOD-GLUCOSE METER
KIT MISCELLANEOUS
Qty: 1 EACH | Refills: 0 | Status: SHIPPED | OUTPATIENT
Start: 2025-02-11 | End: 2025-02-11

## 2025-02-11 NOTE — TELEPHONE ENCOUNTER
Pt couldn't tolerate the drink given to her today for her 1 hour glucose test.  She threw it up.  She was given the option to test by checking her glucose with a glucometer 4 times a day for 2 weeks or by doing a 3 hour and she chose to have the 3 hour done.  She was given an order to take to [facility] and she is going to go there either Monday or Tuesday morning of next week.  I have faxed the order to [facility] today at [phone].

## 2025-02-11 NOTE — PROGRESS NOTES
Routine Prenatal Visit     Subjective  Edwina Disla is a 24 y.o.  at 25w0d here for her routine OB visit.   She is taking her prenatal vitamins.Reports no loss of fluid or vaginal bleeding. Patient doing well.     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    UTI (urinary tract infection) during pregnancy         OB History    Para Term  AB Living   4 2 2   1 2   SAB IAB Ectopic Molar Multiple Live Births   1         2      # Outcome Date GA Lbr Hakan/2nd Weight Sex Type Anes PTL Lv   4 Current            3 SAB 24     SAB      2 Term 20 38w0d  3062 g (6 lb 12 oz) M CS-LTranv  N ALVA   1 Term 18 40w0d  3118 g (6 lb 14 oz) F CS-LTranv  N ALVA           ROS:    General ROS: negative for - chills or fatigue  Genito-Urinary ROS: negative for  change in urinary stream, vaginal discharge     Objective  Physical Exam:    Vitals:    25 1058   BP: 102/60       Uterine Size: not examined, US today  FHT: 110-160 BPM        Assessment/Plan:   Diagnoses and all orders for this visit:    1. Supervision of other normal pregnancy, antepartum (Primary)  Assessment & Plan:  HELIO finalized: 2025 by LMP, 11wk US and ACOG    Genetic testing (NIPS-Quad)/CF/AFP:  NIPS neg XY, CF/SMA neg    COVID: recommended  Flu: recommended  Tdap: 27-36 weeks  RSV: 32-36 weeks    1hr gtt: not done due to vomiting, wants to do 3hr gtt    Rhogam: Apos  ?Sterilization:    EPDS:     Anatomy US:  EFW 45%, AC 38%, cephalic, anterior grade 1 placenta, normal anatomy, left renal dilation 6 mm, limited profile  FU US:  EFW 20%, AC 25%, anterior grade 1 placenta, follow-up anatomy WNL    PROBLEM LIST/PLAN:   Hx of C/S x2- recommend repeat  Hx of HTN in G1- baseline labs normal  Tobacco use- cessation  advised  Syphilis- RPR titer 1:1  >>Penicillin therapy plan placed and completed  >>confirmation testing ordered 25, positive confirmation testing    Orders:  -     POC Urinalysis Dipstick  -     CBC (No Diff); Future  -     Cancel: RPR, Rfx Qn RPR / Confirm TP  -     Discontinue: glucose monitor monitoring kit; Check BS FOUR times per day (fasting and 2hrs after meals) and record.  Bring blood sugar record to next office visit.  Dispense: 1 each; Refill: 0  -     Discontinue: glucose blood test strip; Check BS 4x/day as instructed.  Dispense: 120 each; Refill: 1  -     Discontinue: Lancets misc; Use 120 each 4 (Four) Times a Day. Check blood sugars four times daily as directed  Dispense: 120 each; Refill: 3  -     Discontinue: Alcohol Swabs pads; Use to clean fingers before checking BS 4 times per day and PRN  Dispense: 120 each; Refill: 3  -     Gestational, Glucose Tolerance, 3 Hour; Future  -     CBC (No Diff)    2. 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 H/O  section    4. Tobacco use during pregnancy, antepartum          Counseling:   OB precautions, leaking, VB, dominique courtney vs PTL/Labor  FKC  Round Ligament Pain:  The uterus has several ligaments which provide support and keep the uterus in place. As the  uterus grows these ligaments are pulled and stretched which often causes sharp stabbing like pain in the inguinal area.   You may find a pregnancy support band helpful. Changing positions may also help. Yoga is a great way to cope with round ligament and low back pain in pregnancy.    Massage may also help with low back pain   Things to Consider at this Point in your Pregnancy:  Some women experience swelling in their feet during pregnancy. Compression stockings may help  Drink plenty of water and stay active   Make sure you are eating frequent small meals, nuts are a wonderful snack to keep with you            Return in about 4 weeks (around 3/11/2025) for Routine OB  visit.      We have gone over prenatal care to include the timing and content of visits. I informed her how to contact the office and/or on call person in the event of any problems and encouraged her to do so when she feels it is necessary.  We then spent time answering her questions which she indicated were answered to her satisfaction.    Alba Henao,   2/11/2025 11:40 EST

## 2025-02-11 NOTE — ASSESSMENT & PLAN NOTE
HELIO finalized: 5/27/2025 by LMP, 11wk US and ACOG    Genetic testing (NIPS-Quad)/CF/AFP:  NIPS neg XY, CF/SMA neg    COVID: recommended  Flu: recommended  Tdap: 27-36 weeks  RSV: 32-36 weeks    1hr gtt: not done due to vomiting, wants to do 3hr gtt    Rhogam: Apos  ?Sterilization:    EPDS:     Anatomy US: 1/14 EFW 45%, AC 38%, cephalic, anterior grade 1 placenta, normal anatomy, left renal dilation 6 mm, limited profile  FU US: 2/11 EFW 20%, AC 25%, anterior grade 1 placenta, follow-up anatomy WNL    PROBLEM LIST/PLAN:   Hx of C/S x2- recommend repeat  Hx of HTN in G1- baseline labs normal  Tobacco use- cessation advised  Syphilis- RPR titer 1:1  >>Penicillin therapy plan placed and completed  >>confirmation testing ordered 01/14/25, positive confirmation testing

## 2025-02-12 LAB — RPR SER-TITR: NEGATIVE {TITER}

## 2025-02-14 ENCOUNTER — PATIENT MESSAGE (OUTPATIENT)
Dept: OBSTETRICS AND GYNECOLOGY | Facility: CLINIC | Age: 25
End: 2025-02-14
Payer: COMMERCIAL

## 2025-02-18 ENCOUNTER — TELEPHONE (OUTPATIENT)
Dept: OBSTETRICS AND GYNECOLOGY | Facility: CLINIC | Age: 25
End: 2025-02-18

## 2025-02-18 NOTE — TELEPHONE ENCOUNTER
Caller: Edwina Disla    Relationship to patient: Self    Best call back number: [phone] (home)     Patient is needing: NEEDING DENTAL CLEARANCE ASAP  PLEASE UPLOAD TO [facility].  DENTIST: [facility]  [address] [place] [address] [address] [place]  PH: [phone]

## 2025-02-25 ENCOUNTER — TELEPHONE (OUTPATIENT)
Dept: OBSTETRICS AND GYNECOLOGY | Facility: CLINIC | Age: 25
End: 2025-02-25

## 2025-02-25 NOTE — TELEPHONE ENCOUNTER
Hub staff attempted to follow warm transfer process and was unsuccessful     Caller: Edwina Disla    Relationship to patient: Self    Best call back number: [phone]      Patient is needing: PT CALLING TO R/S HER APT ON 03/11/2025 FOR ANYTIME AFTER 2. NO AVAIL APT IN APPROPRIATE TIMEFRAME FOR HUB TO [facility].

## 2025-02-27 ENCOUNTER — PATIENT OUTREACH (OUTPATIENT)
Dept: LABOR AND DELIVERY | Facility: HOSPITAL | Age: 25
End: 2025-02-27
Payer: COMMERCIAL

## 2025-02-27 NOTE — OUTREACH NOTE
[facility]  Check-In    Current Estimated Gestational Age: 27w2d      Questions/Answers      Flowsheet Row Responses   Best Method for Contacting Cell   Demographics Reviewed Yes   Currently Employed Yes   Able to keep appointments as scheduled Yes   Baby Active/Feeling Fetal Movemen Yes   How are you presently feeling? Good   Resource/Environmental Concerns None   Do you have any questions related to your care experience, your pregnancy, plans for delivery, any concerns, etc? No              Mile Romero RN  Maternity Nurse Navigator    2/27/2025, 14:53 EST

## 2025-03-19 NOTE — PROGRESS NOTES
Routine Prenatal Visit     Subjective  Edwina Disla is a 25 y.o.  at 30w2d here for her routine OB visit.   She is taking her prenatal vitamins.Reports no loss of fluid or vaginal bleeding. Patient doing well.     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    UTI (urinary tract infection) during pregnancy         OB History    Para Term  AB Living   4 2 2  1 2   SAB IAB Ectopic Molar Multiple Live Births   1     2      # Outcome Date GA Lbr Hakan/2nd Weight Sex Type Anes PTL Lv   4 Current            3 SAB 24     SAB      2 Term 20 38w0d  3062 g (6 lb 12 oz) M CS-LTranv  N ALVA   1 Term 18 40w0d  3118 g (6 lb 14 oz) F CS-LTranv  N ALVA           ROS:  General ROS: negative for - chills or fatigue  Genito-Urinary ROS: negative for  change in urinary stream, vaginal discharge     Objective  Physical Exam:   Vitals:    25 1421   BP: 133/80       Uterine Size: size equals dates  FHT: 110-160 BPM         Assessment/Plan:   Diagnoses and all orders for this visit:    1. Supervision of other normal pregnancy, antepartum (Primary)  Assessment & Plan:  HELIO finalized: 2025 by LMP, 11wk US and ACOG    Genetic testing (NIPS-Quad)/CF/AFP:  NIPS neg XY, CF/SMA neg    COVID: recommended  Flu: recommended  Tdap: 27-36 weeks  RSV: 32-36 weeks    1hr gtt: not done due to vomiting, wants to do 3hr gtt- passed    Rhogam: Apos  ?Sterilization:    EPDS:     Anatomy US:  EFW 45%, AC 38%, cephalic, anterior grade 1 placenta, normal anatomy, left renal dilation 6 mm, limited profile  FU US:  EFW 20%, AC 25%, anterior grade 1 placenta, follow-up anatomy WNL    PROBLEM LIST/PLAN:   Hx of C/S x2- recommend repeat    Hx of HTN in G1- baseline labs normal    Tobacco use- cessation advised    Syphilis-  RPR titer 1:1  >>Penicillin therapy plan placed and completed  >>confirmation testing ordered 25, positive confirmation testing  >> RPR negative    Orders:  -     POC Urinalysis Dipstick  -     US Ob 14 + Weeks Single or First Gestation; Future    2. H/O  section  -     US Ob 14 + Weeks Single or First Gestation; Future    3. Syphilis of mother during pregnancy            Counseling:   OB precautions, leaking, VB, dominique courtney vs PTL/Labor  FKC  Round Ligament Pain:  The uterus has several ligaments which provide support and keep the uterus in place. As the  uterus grows these ligaments are pulled and stretched which often causes sharp stabbing like pain in the inguinal area.   You may find a pregnancy support band helpful. Changing positions may also help. Yoga is a great way to cope with round ligament and low back pain in pregnancy.    Massage may also help with low back pain   Things to Consider at this Point in your Pregnancy:  Some women experience swelling in their feet during pregnancy. Compression stockings may help  Drink plenty of water and stay active   Make sure you are eating frequent small meals, nuts are a wonderful snack to keep with you            Return in about 2 weeks (around 4/3/2025) for Routine OB visit.      We have gone over prenatal care to include the timing and content of visits. I informed her how to contact the office and/or on call person in the event of any problems and encouraged her to do so when she feels it is necessary.  We then spent time answering her questions which she indicated were answered to her satisfaction.    Alba Henao,   3/20/2025 14:51 EDT

## 2025-03-20 ENCOUNTER — ROUTINE PRENATAL (OUTPATIENT)
Dept: OBSTETRICS AND GYNECOLOGY | Facility: CLINIC | Age: 25
End: 2025-03-20
Payer: COMMERCIAL

## 2025-03-20 VITALS — SYSTOLIC BLOOD PRESSURE: 133 MMHG | DIASTOLIC BLOOD PRESSURE: 80 MMHG | WEIGHT: 152 LBS | BODY MASS INDEX: 26.93 KG/M2

## 2025-03-20 DIAGNOSIS — Z34.80 SUPERVISION OF OTHER NORMAL PREGNANCY, ANTEPARTUM: Primary | ICD-10-CM

## 2025-03-20 DIAGNOSIS — O98.119 SYPHILIS OF MOTHER DURING PREGNANCY: ICD-10-CM

## 2025-03-20 DIAGNOSIS — Z98.891 H/O CESAREAN SECTION: ICD-10-CM

## 2025-03-20 LAB
GLUCOSE UR STRIP-MCNC: NEGATIVE MG/DL
PROT UR STRIP-MCNC: NEGATIVE MG/DL

## 2025-03-20 RX ORDER — BLOOD-GLUCOSE METER
EACH MISCELLANEOUS
COMMUNITY
Start: 2025-02-12

## 2025-03-20 RX ORDER — LANCETS 33 GAUGE
EACH MISCELLANEOUS
COMMUNITY
Start: 2025-02-11

## 2025-03-20 RX ORDER — BLOOD SUGAR DIAGNOSTIC
STRIP MISCELLANEOUS
COMMUNITY
Start: 2025-02-11

## 2025-03-20 NOTE — ASSESSMENT & PLAN NOTE
HELIO finalized: 5/27/2025 by LMP, 11wk US and ACOG    Genetic testing (NIPS-Quad)/CF/AFP:  NIPS neg XY, CF/SMA neg    COVID: recommended  Flu: recommended  Tdap: 27-36 weeks  RSV: 32-36 weeks    1hr gtt: not done due to vomiting, wants to do 3hr gtt- passed    Rhogam: Apos  ?Sterilization:    EPDS:     Anatomy US: 1/14 EFW 45%, AC 38%, cephalic, anterior grade 1 placenta, normal anatomy, left renal dilation 6 mm, limited profile  FU US: 2/11 EFW 20%, AC 25%, anterior grade 1 placenta, follow-up anatomy WNL    PROBLEM LIST/PLAN:   Hx of C/S x2- recommend repeat    Hx of HTN in G1- baseline labs normal    Tobacco use- cessation advised    Syphilis- RPR titer 1:1  >>Penicillin therapy plan placed and completed  >>confirmation testing ordered 01/14/25, positive confirmation testing  >>2/11 RPR negative

## 2025-04-04 ENCOUNTER — ROUTINE PRENATAL (OUTPATIENT)
Dept: OBSTETRICS AND GYNECOLOGY | Age: 25
End: 2025-04-04
Payer: COMMERCIAL

## 2025-04-04 VITALS — WEIGHT: 148 LBS | SYSTOLIC BLOOD PRESSURE: 118 MMHG | BODY MASS INDEX: 26.22 KG/M2 | DIASTOLIC BLOOD PRESSURE: 74 MMHG

## 2025-04-04 DIAGNOSIS — Z34.80 SUPERVISION OF OTHER NORMAL PREGNANCY, ANTEPARTUM: Primary | ICD-10-CM

## 2025-04-04 LAB
GLUCOSE UR STRIP-MCNC: NEGATIVE MG/DL
PROT UR STRIP-MCNC: NEGATIVE MG/DL

## 2025-04-04 NOTE — PROGRESS NOTES
OB FOLLOW UP        [facility] [address]  [facility]  [facility] [place] [address]  Dept: [phone]  Dept Fax: [phone]  Loc: [phone]  Loc Fax: [phone]

## 2025-04-14 NOTE — PROGRESS NOTES
Routine Prenatal Visit     Subjective  Edwina Disla is a 25 y.o.  at 34w1d here for her routine OB visit.   She is taking her prenatal vitamins.Reports no loss of fluid or vaginal bleeding. Patient doing ok. She is having some dysuria.     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    UTI (urinary tract infection) during pregnancy    Dilation of renal pelvis of fetus         OB History    Para Term  AB Living   4 2 2  1 2   SAB IAB Ectopic Molar Multiple Live Births   1     2      # Outcome Date GA Lbr Hakan/2nd Weight Sex Type Anes PTL Lv   4 Current            3 SAB 24     SAB      2 Term 20 38w0d  3062 g (6 lb 12 oz) M CS-LTranv  N ALVA   1 Term 18 40w0d  3118 g (6 lb 14 oz) F CS-LTranv  N ALVA           ROS:  General ROS: negative for - chills or fatigue  Genito-Urinary ROS: negative for  change in urinary stream, vaginal discharge     Objective  Physical Exam:   Vitals:    25 1549   BP: 117/60       Uterine Size: not examined, US today  FHT: 110-160 BPM         Assessment/Plan:   Diagnoses and all orders for this visit:    1. Supervision of other normal pregnancy, antepartum (Primary)  Assessment & Plan:  HELIO finalized: 2025 by LMP, 11wk US and ACOG    Genetic testing (NIPS-Quad)/CF/AFP:  NIPS neg XY, CF/SMA neg    COVID: recommended  Flu: recommended  Tdap: 27-36 weeks  RSV: 32-36 weeks    1hr gtt: not done due to vomiting, wants to do 3hr gtt- passed    Rhogam: Apos  ?Sterilization:    EPDS:     Anatomy US:  EFW 45%, AC 38%, cephalic, anterior grade 1 placenta, normal anatomy, left renal dilation 6 mm, limited profile  FU US:  EFW 20%, AC 25%, anterior grade 1 placenta, follow-up anatomy WNL   EFW 35%, AC 47%, vertex, OTILIA 18, b/l renal pelvis  dilation    PROBLEM LIST/PLAN:   Hx of C/S x2- recommend repeat- scheduled for     Hx of HTN in G1- baseline labs normal    Tobacco use- cessation advised    Syphilis- RPR titer 1:1  >>Penicillin therapy plan placed and completed  >>confirmation testing ordered 25, positive confirmation testing  >> RPR negative    Orders:  -     POC Urinalysis Dipstick  -     Urine Culture - Urine, Urine, Clean Catch; Future  -     Urine Culture - Urine, Urine, Clean Catch    2. H/O  section  -     Urine Culture - Urine, Urine, Clean Catch; Future  -     Urine Culture - Urine, Urine, Clean Catch  -     sodium chloride 0.9 % flush 10 mL  -     sodium chloride 0.9 % flush 10 mL  -     sodium chloride 0.9 % infusion 40 mL  -     lidocaine PF 1% (XYLOCAINE) injection 0.5 mL  -     lactated ringers bolus 1,000 mL  -     lactated ringers infusion  -     Sod Citrate-Citric Acid (BICITRA) oral solution 30 mL  -     famotidine (PEPCID) injection 20 mg  -     acetaminophen (TYLENOL) tablet 1,000 mg  -     ceFAZolin (ANCEF) 2 g in sodium chloride 0.9 % 100 mL IVPB  -     oxytocin (PITOCIN) 30 units in 0.9% sodium chloride 500 mL (premix)  -     oxytocin (PITOCIN) 30 units in 0.9% sodium chloride 500 mL (premix)  -     ketorolac (TORADOL) injection 30 mg  -     acetaminophen (TYLENOL) tablet 650 mg  -     ondansetron ODT (ZOFRAN-ODT) disintegrating tablet 4 mg  -     ondansetron (ZOFRAN) injection 4 mg  -     famotidine (PEPCID) injection 20 mg  -     famotidine (PEPCID) tablet 20 mg    3. Syphilis of mother during pregnancy  -     Urine Culture - Urine, Urine, Clean Catch; Future  -     Urine Culture - Urine, Urine, Clean Catch    4. Dysuria  -     Urine Culture - Urine, Urine, Clean Catch; Future  -     Urine Culture - Urine, Urine, Clean Catch    5. Dilation of renal pelvis of fetus    Other orders  -     Admit To Obstetrics Inpatient; Standing  -     Code Status and Medical Interventions: CPR (Attempt to Resuscitate);  Full; Standing  -     Obtain Informed Consent; Standing  -     Vital Signs q 4 while awake; Standing  -     Vital Signs Per Hospital Policy; Standing  -     Continuous Fetal Monitoring With NST on Admission and Prior to Initiation of Oxytocin.; Standing  -     External Uterine Contraction Monitoring; Standing  -     Notify Provider (Specified); Standing  -     Notify Provider of Tachysystole (Per Hospital Algorithm); Standing  -     Notify Provider if Membranes Ruptured, Bleeding Greater Than 1 Pad Per Hour, Fetal Heart Tone Abnormality or Severe Pain; Standing  -     Bed Rest With Bathroom Privileges; Standing  -     Insert Indwelling Urinary Catheter; Standing  -     Assess Need for Indwelling Urinary Catheter - Follow Removal Protocol; Standing  -     Urinary Catheter Care; Standing  -     Abdominal Prep With Clippers; Standing  -     Chlorhexadine Skin Prep Unless Otherwise Indicated; Standing  -     SCD (Sequential Compression Devices); Standing  -     NPO Diet NPO Type: Strict NPO; Standing  -     Inpatient Anesthesiology Consult; Standing  -     Type & Screen; Standing  -     CBC (No Diff); Standing  -     Treponema pallidum AB w/Reflex RPR; Standing  -     Urine Drug Screen - Urine, Clean Catch; Standing  -     Insert Peripheral IV; Standing  -     Saline Lock & Maintain IV Access; Standing  -     Notify Provider (Specified); Standing  -     Vital Signs Per Hospital Policy; Standing  -     Strict Bed Rest; Standing  -     Fundal & Lochia Check; Standing  -     Diet: Regular/House; Fluid Consistency: Thin (IDDSI 0); Standing  -     Blood Gas, Arterial, Cord; Standing  -     Blood Gas, Venous, Cord; Standing  -     If indicated -- Please administer RH Immunoglobulin based on results of cord blood evaluation and fetal screen lab tests, pharmacy to dispense; Standing            Counseling:   OB precautions, leaking, VB, dominique courtney vs PTL/Labor  [facility]  CS scheduled  Round Ligament Pain:  The uterus has several  ligaments which provide support and keep the uterus in place. As the  uterus grows these ligaments are pulled and stretched which often causes sharp stabbing like pain in the inguinal area.   You may find a pregnancy support band helpful. Changing positions may also help. Yoga is a great way to cope with round ligament and low back pain in pregnancy.    Massage may also help with low back pain   Things to Consider at this Point in your Pregnancy:  Some women experience swelling in their feet during pregnancy. Compression stockings may help  Drink plenty of water and stay active   Make sure you are eating frequent small meals, nuts are a wonderful snack to keep with you            Return in about 2 weeks (around 4/30/2025) for Routine OB visit.      We have gone over prenatal care to include the timing and content of visits. I informed her how to contact the office and/or on call person in the event of any problems and encouraged her to do so when she feels it is necessary.  We then spent time answering her questions which she indicated were answered to her satisfaction.    Alba Henao DO  4/16/2025 20:50 EDT

## 2025-04-16 ENCOUNTER — ROUTINE PRENATAL (OUTPATIENT)
Dept: OBSTETRICS AND GYNECOLOGY | Age: 25
End: 2025-04-16
Payer: COMMERCIAL

## 2025-04-16 VITALS — DIASTOLIC BLOOD PRESSURE: 60 MMHG | SYSTOLIC BLOOD PRESSURE: 117 MMHG | BODY MASS INDEX: 26.39 KG/M2 | WEIGHT: 149 LBS

## 2025-04-16 DIAGNOSIS — Z34.80 SUPERVISION OF OTHER NORMAL PREGNANCY, ANTEPARTUM: Primary | ICD-10-CM

## 2025-04-16 DIAGNOSIS — O98.119 SYPHILIS OF MOTHER DURING PREGNANCY: ICD-10-CM

## 2025-04-16 DIAGNOSIS — R30.0 DYSURIA: ICD-10-CM

## 2025-04-16 DIAGNOSIS — N28.89 DILATION OF RENAL PELVIS: ICD-10-CM

## 2025-04-16 DIAGNOSIS — Z98.891 H/O CESAREAN SECTION: ICD-10-CM

## 2025-04-16 LAB
GLUCOSE UR STRIP-MCNC: NEGATIVE MG/DL
PROT UR STRIP-MCNC: NEGATIVE MG/DL

## 2025-04-16 PROCEDURE — 87086 URINE CULTURE/COLONY COUNT: CPT | Performed by: STUDENT IN AN ORGANIZED HEALTH CARE EDUCATION/TRAINING PROGRAM

## 2025-04-16 RX ORDER — FAMOTIDINE 10 MG/ML
20 INJECTION, SOLUTION INTRAVENOUS ONCE AS NEEDED
OUTPATIENT
Start: 2025-04-16

## 2025-04-16 RX ORDER — OXYTOCIN/0.9 % SODIUM CHLORIDE 30/500 ML
250 PLASTIC BAG, INJECTION (ML) INTRAVENOUS CONTINUOUS
OUTPATIENT
Start: 2025-04-16 | End: 2025-04-16

## 2025-04-16 RX ORDER — OXYTOCIN/0.9 % SODIUM CHLORIDE 30/500 ML
999 PLASTIC BAG, INJECTION (ML) INTRAVENOUS ONCE
OUTPATIENT
Start: 2025-04-16 | End: 2025-04-16

## 2025-04-16 RX ORDER — SODIUM CHLORIDE 9 MG/ML
40 INJECTION, SOLUTION INTRAVENOUS AS NEEDED
OUTPATIENT
Start: 2025-04-16

## 2025-04-16 RX ORDER — ACETAMINOPHEN 500 MG
1000 TABLET ORAL ONCE
OUTPATIENT
Start: 2025-04-16 | End: 2025-04-16

## 2025-04-16 RX ORDER — ACETAMINOPHEN 325 MG/1
650 TABLET ORAL ONCE AS NEEDED
OUTPATIENT
Start: 2025-04-16

## 2025-04-16 RX ORDER — FAMOTIDINE 10 MG
20 TABLET ORAL ONCE AS NEEDED
OUTPATIENT
Start: 2025-04-16

## 2025-04-16 RX ORDER — ONDANSETRON 2 MG/ML
4 INJECTION INTRAMUSCULAR; INTRAVENOUS EVERY 6 HOURS PRN
OUTPATIENT
Start: 2025-04-16

## 2025-04-16 RX ORDER — SODIUM CHLORIDE 0.9 % (FLUSH) 0.9 %
10 SYRINGE (ML) INJECTION AS NEEDED
OUTPATIENT
Start: 2025-04-16

## 2025-04-16 RX ORDER — SODIUM CHLORIDE, SODIUM LACTATE, POTASSIUM CHLORIDE, CALCIUM CHLORIDE 600; 310; 30; 20 MG/100ML; MG/100ML; MG/100ML; MG/100ML
150 INJECTION, SOLUTION INTRAVENOUS CONTINUOUS
OUTPATIENT
Start: 2025-04-16 | End: 2025-04-18

## 2025-04-16 RX ORDER — ONDANSETRON 4 MG/1
4 TABLET, ORALLY DISINTEGRATING ORAL EVERY 6 HOURS PRN
OUTPATIENT
Start: 2025-04-16

## 2025-04-16 RX ORDER — CITRIC ACID/SODIUM CITRATE 334-500MG
30 SOLUTION, ORAL ORAL ONCE
OUTPATIENT
Start: 2025-04-16 | End: 2025-04-16

## 2025-04-16 RX ORDER — KETOROLAC TROMETHAMINE 15 MG/ML
30 INJECTION, SOLUTION INTRAMUSCULAR; INTRAVENOUS ONCE
OUTPATIENT
Start: 2025-04-16 | End: 2025-04-16

## 2025-04-16 RX ORDER — LIDOCAINE HYDROCHLORIDE 10 MG/ML
0.5 INJECTION, SOLUTION EPIDURAL; INFILTRATION; INTRACAUDAL; PERINEURAL ONCE AS NEEDED
OUTPATIENT
Start: 2025-04-16

## 2025-04-16 RX ORDER — SODIUM CHLORIDE 0.9 % (FLUSH) 0.9 %
10 SYRINGE (ML) INJECTION EVERY 12 HOURS SCHEDULED
OUTPATIENT
Start: 2025-04-16

## 2025-04-17 ENCOUNTER — TELEPHONE (OUTPATIENT)
Dept: OBSTETRICS AND GYNECOLOGY | Age: 25
End: 2025-04-17

## 2025-04-17 NOTE — TELEPHONE ENCOUNTER
Caller: Edwina Disla    Relationship to patient: Self    Best call back number: [phone] ANYTIME, CAN LVM IF NA - CAN SEND MYCHART MSG AS WELL IF NA    Chief complaint: OB PT - STATES SHE NEEDS A 2 WK OB F/U    Type of visit: OB F/U PT IS 34WKS    Requested date:  ANYDAY, PREFER AFTERNOON    If rescheduling, when is the original appointment:      Additional notes:

## 2025-04-18 LAB — BACTERIA SPEC AEROBE CULT: NORMAL

## 2025-04-19 ENCOUNTER — PATIENT MESSAGE (OUTPATIENT)
Dept: OBSTETRICS AND GYNECOLOGY | Age: 25
End: 2025-04-19
Payer: COMMERCIAL

## 2025-04-23 ENCOUNTER — ROUTINE PRENATAL (OUTPATIENT)
Dept: OBSTETRICS AND GYNECOLOGY | Age: 25
End: 2025-04-23
Payer: COMMERCIAL

## 2025-04-23 VITALS — WEIGHT: 147.8 LBS | DIASTOLIC BLOOD PRESSURE: 67 MMHG | BODY MASS INDEX: 26.18 KG/M2 | SYSTOLIC BLOOD PRESSURE: 114 MMHG

## 2025-04-23 DIAGNOSIS — Z34.80 SUPERVISION OF OTHER NORMAL PREGNANCY, ANTEPARTUM: Primary | ICD-10-CM

## 2025-04-23 DIAGNOSIS — N89.8 VAGINAL ITCHING: ICD-10-CM

## 2025-04-23 LAB
BILIRUB BLD-MCNC: NEGATIVE MG/DL
CLARITY, POC: CLEAR
COLOR UR: YELLOW
GLUCOSE UR STRIP-MCNC: NEGATIVE MG/DL
KETONES UR QL: NEGATIVE
LEUKOCYTE EST, POC: NEGATIVE
NITRITE UR-MCNC: NEGATIVE MG/ML
PH UR: 6 [PH] (ref 5–8)
PROT UR STRIP-MCNC: NEGATIVE MG/DL
RBC # UR STRIP: NEGATIVE /UL
SP GR UR: 1 (ref 1–1.03)
UROBILINOGEN UR QL: NORMAL

## 2025-04-23 PROCEDURE — 87801 DETECT AGNT MULT DNA AMPLI: CPT | Performed by: NURSE PRACTITIONER

## 2025-04-23 PROCEDURE — 87798 DETECT AGENT NOS DNA AMP: CPT | Performed by: NURSE PRACTITIONER

## 2025-04-23 PROCEDURE — 87653 STREP B DNA AMP PROBE: CPT | Performed by: NURSE PRACTITIONER

## 2025-04-23 RX ORDER — CLOTRIMAZOLE 1 %
CREAM WITH APPLICATOR VAGINAL NIGHTLY
Qty: 45 G | Refills: 0 | Status: SHIPPED | OUTPATIENT
Start: 2025-04-23 | End: 2025-04-25

## 2025-04-23 NOTE — ASSESSMENT & PLAN NOTE
Here with complaints of vaginal itching and burning.  Tried taking some left over keflex but it didn't really help.  Reports good fetal movement  GBS and BV/yeast screen collected today  Fetal kick counts   labor precautions

## 2025-04-23 NOTE — PROGRESS NOTES
OB FOLLOW UP        Chief Complaint   Patient presents with    Urinary Tract Infection     Itching and burning for a week, been taking cephalexin she had from the beginning of her pregnancy       Subjective:   Vaginal itching and burning    Objective:  /67   Wt 67 kg (147 lb 12.8 oz)   LMP 2024 (Exact Date)   BMI 26.18 kg/m²   Uterine Size: size equals dates  FHT: 110-160 BPM  GBS RV swab performed today  Vag screen performed  See OB flow for LE edema, cvx exam if performed, and Upro/Uglu    Assessment and Plan:  35w1d  Reassuring pregnancy progress.  Questions answered.  Diagnoses and all orders for this visit:    1. Supervision of other normal pregnancy, antepartum (Primary)  Overview:  HELIO finalized: 2025 by LMP, 11wk US and ACOG    Genetic testing (NIPS-Quad)/CF/AFP:  NIPS neg XY, CF/SMA neg    COVID: recommended  Flu: recommended  Tdap: 27-36 weeks  RSV: 32-36 weeks    1hr gtt: not done due to vomiting, wants to do 3hr gtt- passed    Rhogam: Apos  ?Sterilization:    EPDS:     Anatomy US:  EFW 45%, AC 38%, cephalic, anterior grade 1 placenta, normal anatomy, left renal dilation 6 mm, limited profile  FU US:  EFW 20%, AC 25%, anterior grade 1 placenta, follow-up anatomy WNL   EFW 35%, AC 47%, vertex, OTILIA 18, b/l renal pelvis dilation    PROBLEM LIST/PLAN:   Hx of C/S x2- recommend repeat- scheduled for     Hx of HTN in G1- baseline labs normal    Tobacco use- cessation advised    Syphilis- RPR titer 1:1  >>Penicillin therapy plan placed and completed  >>confirmation testing ordered 25, positive confirmation testing  >> RPR negative           Assessment & Plan:  Here with complaints of vaginal itching and burning.  Tried taking some left over keflex but it didn't really help.  Reports good fetal movement  GBS and BV/yeast screen collected today  Fetal kick counts   labor precautions    Orders:  -     POC Urinalysis Dipstick  -     Group B Strep Molecular by PCR  - Swab, Vaginal/Rectum    2. Vaginal itching  -     NuSwab BV & Candida - Swab, Cervix  -     clotrimazole (LOTRIMIN) 1 % vaginal cream; Insert  into the vagina Every Night. Insert one applicator of cream into vagina nightly for 7 nights  Dispense: 45 g; Refill: 0      Counseling:    OB precautions, leaking, VB, dominique courtney vs PTL/Labor  FKC  Continue PNV.  Importance of healthy eating and exercise.    Return in about 6 days (around 4/29/2025) for Dr. Henao, OB follow up.        Gregorio Marley, APRN  04/23/2025    [facility] [address]  [facility]  [address] [place] [address] [address] [place] [address]  Dept: [phone]  Dept Fax: [phone]  Loc: [phone]

## 2025-04-24 LAB — GP B STREP DNA VAG+RECTUM QL NAA+PROBE: NEGATIVE

## 2025-04-25 ENCOUNTER — TELEPHONE (OUTPATIENT)
Dept: OBSTETRICS AND GYNECOLOGY | Age: 25
End: 2025-04-25
Payer: COMMERCIAL

## 2025-04-25 DIAGNOSIS — N89.8 VAGINAL ITCHING: ICD-10-CM

## 2025-04-25 LAB
A VAGINAE DNA VAG QL NAA+PROBE: ABNORMAL SCORE
BVAB2 DNA VAG QL NAA+PROBE: ABNORMAL SCORE
C ALBICANS DNA VAG QL NAA+PROBE: POSITIVE
C GLABRATA DNA VAG QL NAA+PROBE: NEGATIVE
MEGA1 DNA VAG QL NAA+PROBE: ABNORMAL SCORE

## 2025-04-25 RX ORDER — CLOTRIMAZOLE 1 %
CREAM WITH APPLICATOR VAGINAL NIGHTLY
Qty: 45 G | Refills: 0 | Status: SHIPPED | OUTPATIENT
Start: 2025-04-25

## 2025-04-25 NOTE — TELEPHONE ENCOUNTER
Name: Edwina Disla    Relationship: Self    Best Callback Number: [phone]    HUB PROVIDED THE RELAY MESSAGE FROM THE OFFICE   PATIENT VOICED UNDERSTANDING AND HAS NO FURTHER QUESTIONS AT THIS TIME    ADDITIONAL INFORMATION:

## 2025-04-25 NOTE — PROGRESS NOTES
Routine Prenatal Visit     Subjective  Edwina Disla is a 25 y.o.  at 36w0d here for her routine OB visit.   She is taking her prenatal vitamins.Reports no loss of fluid or vaginal bleeding. Patient doing well. Had a episode of dizziness last week.     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    UTI (urinary tract infection) during pregnancy    Dilation of renal pelvis of fetus         OB History    Para Term  AB Living   4 2 2  1 2   SAB IAB Ectopic Molar Multiple Live Births   1     2      # Outcome Date GA Lbr Hakan/2nd Weight Sex Type Anes PTL Lv   4 Current            3 SAB 24     SAB      2 Term 20 38w0d  3062 g (6 lb 12 oz) M CS-LTranv  N ALVA   1 Term 18 40w0d  3118 g (6 lb 14 oz) F CS-LTranv  N ALVA           ROS:  General ROS: negative for - chills or fatigue  Genito-Urinary ROS: negative for  change in urinary stream, vaginal discharge     Objective  Physical Exam:   Vitals:    25 1507   BP: 117/72       Uterine Size: size equals dates  FHT: 110-160 BPM         Assessment/Plan:   Diagnoses and all orders for this visit:    1. Supervision of other normal pregnancy, antepartum (Primary)  -     POC Urinalysis Dipstick    2. Syphilis of mother during pregnancy    3. H/O  section            Counseling:   OB precautions, leaking, VB, dominique courtney vs PTL/Labor  FKC  Round Ligament Pain:  The uterus has several ligaments which provide support and keep the uterus in place. As the  uterus grows these ligaments are pulled and stretched which often causes sharp stabbing like pain in the inguinal area.   You may find a pregnancy support band helpful. Changing positions may also help. Yoga is a great way to cope with round ligament and low back pain in pregnancy.    Massage may  also help with low back pain   Things to Consider at this Point in your Pregnancy:  Some women experience swelling in their feet during pregnancy. Compression stockings may help  Drink plenty of water and stay active   Make sure you are eating frequent small meals, nuts are a wonderful snack to keep with you            Return in about 1 week (around 5/6/2025) for Routine OB visit.      We have gone over prenatal care to include the timing and content of visits. I informed her how to contact the office and/or on call person in the event of any problems and encouraged her to do so when she feels it is necessary.  We then spent time answering her questions which she indicated were answered to her satisfaction.    Alba Henao DO  5/1/2025 10:55 EDT

## 2025-04-25 NOTE — TELEPHONE ENCOUNTER
See 4/25/25 phone encounter. Script on back order at her regular pharmacy and requested transferred to different place.

## 2025-04-25 NOTE — TELEPHONE ENCOUNTER
Caller:  Edwina Disla  Female, 25 y.o., 2000  MRN: [id number]  CSN: [id number]  Phone: [phone]    Relationship: SELF            PT REQ RX FOR CLOTRIMAZOLE CREAM BE SENT TO    [id number] [place] [address]  AT [facility] [address] - [phone]  - [phone] [facility] [facility] [address]   Phone: [phone] Fax: [phone]   Hours: Not open 24 hours   MEDICATION IS ON  BACK ORDER FOR 3 DAY'S AT CURRENT [facility]

## 2025-04-29 ENCOUNTER — ROUTINE PRENATAL (OUTPATIENT)
Dept: OBSTETRICS AND GYNECOLOGY | Age: 25
End: 2025-04-29
Payer: COMMERCIAL

## 2025-04-29 DIAGNOSIS — Z98.891 H/O CESAREAN SECTION: ICD-10-CM

## 2025-04-29 DIAGNOSIS — Z34.80 SUPERVISION OF OTHER NORMAL PREGNANCY, ANTEPARTUM: Primary | ICD-10-CM

## 2025-04-29 DIAGNOSIS — O98.119 SYPHILIS OF MOTHER DURING PREGNANCY: ICD-10-CM

## 2025-04-30 ENCOUNTER — TELEPHONE (OUTPATIENT)
Dept: OBSTETRICS AND GYNECOLOGY | Age: 25
End: 2025-04-30
Payer: COMMERCIAL

## 2025-04-30 VITALS — WEIGHT: 148 LBS | BODY MASS INDEX: 26.22 KG/M2 | SYSTOLIC BLOOD PRESSURE: 117 MMHG | DIASTOLIC BLOOD PRESSURE: 72 MMHG

## 2025-04-30 NOTE — TELEPHONE ENCOUNTER
I tried to call the pt to get some information from her regarding her visit yesterday.  I had to leave a voicemail message.

## 2025-05-06 ENCOUNTER — TELEPHONE (OUTPATIENT)
Dept: OBSTETRICS AND GYNECOLOGY | Age: 25
End: 2025-05-06

## 2025-05-06 ENCOUNTER — TELEPHONE (OUTPATIENT)
Dept: OBSTETRICS AND GYNECOLOGY | Age: 25
End: 2025-05-06
Payer: COMMERCIAL

## 2025-05-06 NOTE — TELEPHONE ENCOUNTER
CLOSING THIS ENCOUNTER - THE PATIENT RETURNED OUT CALL AND WASN;T ABLE TO DO THE 1145AM APPTMT TODAY -      THE [address] OFFICE C.S[facility] ATTEMPTED TO REACH THE PATIENT, AS WELL TODAY (SEPARATE ENCOUNTER) TO SEE IF COULD COME IN LATER IN PM TO SEE DR LUEVANO.  THAT ENCOUNTER IS STILL OPEN.    CLOSING THIS ENCOUNTER

## 2025-05-06 NOTE — TELEPHONE ENCOUNTER
0832am: left pt a vm advising why she was moved from tomorrow with Gregorio Spencer -  to today with Dr Henao @ 1145am at the 1324 office location (too far along to see TIA, GIANNI). Advised on vm if unable to do today @ 1145am - to call offie back to let us know @ [phone].mp

## 2025-05-06 NOTE — TELEPHONE ENCOUNTER
Left voicemail.  called to the hospital for a delivery and will not be back till after lunch to see patients. Offered patient to come in at 1:30PM to see . Please confirm with patient if able and let office know so we can move her appointment time.     HUB TO RELAY.

## 2025-05-06 NOTE — TELEPHONE ENCOUNTER
See below encounter    Called patient per CR to find out patient's availability:    Patient advised could come in tomorrow anytime before 1:00pm - as she has 2 other appointments at 2:00pm & 2:30pm.  This information was forwarded to CR in the previous encounter.

## 2025-05-06 NOTE — TELEPHONE ENCOUNTER
Caller: Edwina Disla    Relationship to patient: Self    Best call back number: [phone]    Chief complaint: OB FOLLOW UP    Type of visit: OB FOLLOWUP    Requested date: 5/7/25     If rescheduling, when is the original appointment: 5/6/25     Additional notes:PT WAS ORIGINALLY SCHEDULED TO SEE SIDRA FANG TOMORROW - HOWEVER, SHE WAS SWITCHED TO DR. LUEVANO FOR TODAY AND PAT CALLED TO STATE THAT WASN'T GOING TO WORK FOR HER - HUB WAS UNABLE TO W/T TO OFFICE PER INSTRUCTIONS ON APPT NOTES    PLEASE CALL PT TO ADVISE / SCHEDULE

## 2025-05-08 ENCOUNTER — ROUTINE PRENATAL (OUTPATIENT)
Dept: OBSTETRICS AND GYNECOLOGY | Age: 25
End: 2025-05-08
Payer: COMMERCIAL

## 2025-05-08 VITALS — BODY MASS INDEX: 25.69 KG/M2 | WEIGHT: 145 LBS | SYSTOLIC BLOOD PRESSURE: 117 MMHG | DIASTOLIC BLOOD PRESSURE: 70 MMHG

## 2025-05-08 DIAGNOSIS — Z98.891 H/O CESAREAN SECTION: ICD-10-CM

## 2025-05-08 DIAGNOSIS — O98.119 SYPHILIS OF MOTHER DURING PREGNANCY: ICD-10-CM

## 2025-05-08 DIAGNOSIS — Z34.80 SUPERVISION OF OTHER NORMAL PREGNANCY, ANTEPARTUM: Primary | ICD-10-CM

## 2025-05-08 DIAGNOSIS — K21.9 BILE ACID ESOPHAGEAL REFLUX: ICD-10-CM

## 2025-05-08 LAB
GLUCOSE UR STRIP-MCNC: NEGATIVE MG/DL
PROT UR STRIP-MCNC: NEGATIVE MG/DL

## 2025-05-08 RX ORDER — PANTOPRAZOLE SODIUM 20 MG/1
20 TABLET, DELAYED RELEASE ORAL DAILY
Qty: 30 TABLET | Refills: 1 | Status: SHIPPED | OUTPATIENT
Start: 2025-05-08 | End: 2026-05-08

## 2025-05-08 NOTE — PROGRESS NOTES
Routine Prenatal Visit     Subjective  Edwina Disla is a 25 y.o.  at 37w2d here for her routine OB visit.   She is taking her prenatal vitamins.Reports no loss of fluid or vaginal bleeding. Patient doing well but continues to have acid reflux. We discussed small meals and to acid Protonix to her regimen. . 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    UTI (urinary tract infection) during pregnancy    Dilation of renal pelvis of fetus         OB History    Para Term  AB Living   4 2 2  1 2   SAB IAB Ectopic Molar Multiple Live Births   1     2      # Outcome Date GA Lbr Hakan/2nd Weight Sex Type Anes PTL Lv   4 Current            3 SAB 24     SAB      2 Term 20 38w0d  3062 g (6 lb 12 oz) M CS-LTranv  N ALVA   1 Term 18 40w0d  3118 g (6 lb 14 oz) F CS-LTranv  N ALVA           ROS:   Review of Systems - General ROS: negative  Psychological ROS: negative  Endocrine ROS: negative  Breast ROS: negative  Respiratory ROS: negative  Cardiovascular ROS: negative  Gastrointestinal ROS: positive for - heartburn and nausea/vomiting  Genito-Urinary ROS: negative  Musculoskeletal ROS: negative    Objective  Physical Exam:   Vitals:    25 1434   BP: 117/70       Uterine Size: size equals dates  FHT: 110-160 BPM    General appearance - alert, well appearing, and in no distress  Mental status - alert, oriented to person, place, and time  Abdomen- Soft, Gravid uterus, non-tender to palpation  Musculoskeletal: negative for - gait disturbance or joint pain  Extremeties: negative swelling or cyanosis   Dermatological: negative rashes or skin lesions       Assessment/Plan:   Diagnoses and all orders for this visit:    1. Supervision of other normal pregnancy, antepartum (Primary)  -     POC Urinalysis  Dipstick    2. 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 H/O  section  Assessment & Plan:  Repeat CS scheduled       4. Bile acid esophageal reflux  -     pantoprazole (Protonix) 20 MG EC tablet; Take 1 tablet by mouth Daily.  Dispense: 30 tablet; Refill: 1            Counseling:   OB precautions, leaking, VB, dominique courtney vs PTL/Labor  FKC  HTN precautions reviewed: HA, vision change, RUQ/epigastric pain, edema  CS scheduled  Round Ligament Pain:  The uterus has several ligaments which provide support and keep the uterus in place. As the  uterus grows these ligaments are pulled and stretched which often causes sharp stabbing like pain in the inguinal area.   You may find a pregnancy support band helpful. Changing positions may also help. Yoga is a great way to cope with round ligament and low back pain in pregnancy.    Massage may also help with low back pain   Things to Consider at this Point in your Pregnancy:  Some women experience swelling in their feet during pregnancy. Compression stockings may help  Drink plenty of water and stay active   Make sure you are eating frequent small meals, nuts are a wonderful snack to keep with you            Return in about 1 week (around 5/15/2025) for Routine OB visit with Dr. bhagat .      We have gone over prenatal care to include the timing and content of visits. I informed her how to contact the office and/or on call person in the event of any problems and encouraged her to do so when she feels it is necessary.  We then spent time answering her questions which she indicated were answered to her satisfaction.    Ambreen Bai DO  2025 14:42 EDT

## 2025-05-13 NOTE — PROGRESS NOTES
Routine Prenatal Visit     Subjective  Edwina Disla is a 25 y.o.  at 38w1d here for her routine OB visit.   She is taking her prenatal vitamins.Reports no loss of fluid or vaginal bleeding. Patient doing well.     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    UTI (urinary tract infection) during pregnancy    Dilation of renal pelvis of fetus         OB History    Para Term  AB Living   4 2 2  1 2   SAB IAB Ectopic Molar Multiple Live Births   1     2      # Outcome Date GA Lbr Hakan/2nd Weight Sex Type Anes PTL Lv   4 Current            3 SAB 24     SAB      2 Term 20 38w0d  3062 g (6 lb 12 oz) M CS-LTranv  N ALVA   1 Term 18 40w0d  3118 g (6 lb 14 oz) F CS-LTranv  N ALVA           ROS:  General ROS: negative for - chills or fatigue  Genito-Urinary ROS: negative for  change in urinary stream, vaginal discharge     Objective  Physical Exam:   Vitals:    25 1349   BP: 106/68       Uterine Size: size equals dates  FHT: 110-160 BPM    SVE 3    Assessment/Plan:   Diagnoses and all orders for this visit:    1. Supervision of other normal pregnancy, antepartum (Primary)  -     POC Urinalysis Dipstick    2. H/O  section    3. Dilation of renal pelvis of fetus            Counseling:   OB precautions, leaking, VB, dominique courtney vs PTL/Labor  FK  Round Ligament Pain:  The uterus has several ligaments which provide support and keep the uterus in place. As the  uterus grows these ligaments are pulled and stretched which often causes sharp stabbing like pain in the inguinal area.   You may find a pregnancy support band helpful. Changing positions may also help. Yoga is a great way to cope with round ligament and low back pain in pregnancy.    Massage may also help with low back pain    Things to Consider at this Point in your Pregnancy:  Some women experience swelling in their feet during pregnancy. Compression stockings may help  Drink plenty of water and stay active   Make sure you are eating frequent small meals, nuts are a wonderful snack to keep with you            Return in about 2 weeks (around 5/28/2025) for incision check- 2 weeks from time of c/s, Postpartum.      We have gone over prenatal care to include the timing and content of visits. I informed her how to contact the office and/or on call person in the event of any problems and encouraged her to do so when she feels it is necessary.  We then spent time answering her questions which she indicated were answered to her satisfaction.    Alba Henao DO  5/14/2025 14:22 EDT

## 2025-05-14 ENCOUNTER — ROUTINE PRENATAL (OUTPATIENT)
Dept: OBSTETRICS AND GYNECOLOGY | Age: 25
End: 2025-05-14
Payer: COMMERCIAL

## 2025-05-14 VITALS — WEIGHT: 145.4 LBS | DIASTOLIC BLOOD PRESSURE: 68 MMHG | BODY MASS INDEX: 25.76 KG/M2 | SYSTOLIC BLOOD PRESSURE: 106 MMHG

## 2025-05-14 DIAGNOSIS — Z34.80 SUPERVISION OF OTHER NORMAL PREGNANCY, ANTEPARTUM: Primary | ICD-10-CM

## 2025-05-14 DIAGNOSIS — Z98.891 H/O CESAREAN SECTION: ICD-10-CM

## 2025-05-14 DIAGNOSIS — N28.89 DILATION OF RENAL PELVIS: ICD-10-CM

## 2025-05-14 LAB
GLUCOSE UR STRIP-MCNC: NEGATIVE MG/DL
PROT UR STRIP-MCNC: NEGATIVE MG/DL

## 2025-05-15 ENCOUNTER — PATIENT OUTREACH (OUTPATIENT)
Dept: LABOR AND DELIVERY | Facility: HOSPITAL | Age: 25
End: 2025-05-15
Payer: COMMERCIAL

## 2025-05-15 NOTE — OUTREACH NOTE
[facility]  Unable to Reach    Questions/Answers      Flowsheet Row Responses   Pending Outreach Prenatal Check-in   Call Attempt First   Outcome No answer/busy, Left message   Next Call Attempt Date 05/16/25            Mile Romero RN  Maternity Nurse Navigator    5/15/2025, 14:06 EDT

## 2025-05-22 ENCOUNTER — PATIENT OUTREACH (OUTPATIENT)
Dept: LABOR AND DELIVERY | Facility: HOSPITAL | Age: 25
End: 2025-05-22
Payer: COMMERCIAL

## 2025-05-22 NOTE — OUTREACH NOTE
[facility]  Unable to Reach    Questions/Answers      Flowsheet Row Responses   Pending Outreach Prenatal Check-in   Call Attempt Second   Outcome No answer/busy, Left message          Scheduled C/S tomorrow    Mile Romero RN  Maternity Nurse Navigator    5/22/2025, 14:25 EDT

## 2025-05-23 ENCOUNTER — HOSPITAL ENCOUNTER (INPATIENT)
Facility: HOSPITAL | Age: 25
LOS: 2 days | Discharge: HOME | End: 2025-05-25
Attending: STUDENT IN AN ORGANIZED HEALTH CARE EDUCATION/TRAINING PROGRAM | Admitting: STUDENT IN AN ORGANIZED HEALTH CARE EDUCATION/TRAINING PROGRAM
Payer: COMMERCIAL

## 2025-05-23 ENCOUNTER — ANESTHESIA (OUTPATIENT)
Dept: LABOR AND DELIVERY | Facility: HOSPITAL | Age: 25
End: 2025-05-23
Payer: COMMERCIAL

## 2025-05-23 ENCOUNTER — ANESTHESIA EVENT (OUTPATIENT)
Dept: LABOR AND DELIVERY | Facility: HOSPITAL | Age: 25
End: 2025-05-23
Payer: COMMERCIAL

## 2025-05-23 DIAGNOSIS — Z98.891 H/O CESAREAN SECTION: ICD-10-CM

## 2025-05-23 PROBLEM — Z34.90 NORMAL PREGNANCY: Status: ACTIVE | Noted: 2025-05-23

## 2025-05-23 LAB
ABO GROUP BLD: NORMAL
AMPHET+METHAMPHET UR QL: NEGATIVE
AMPHETAMINES UR QL: NEGATIVE
ATMOSPHERIC PRESS: 744.8 MMHG
ATMOSPHERIC PRESS: 745.8 MMHG
BARBITURATES UR QL SCN: NEGATIVE
BASE EXCESS BLDCOA CALC-SCNC: -1.9 MMOL/L (ref -2–2)
BASE EXCESS BLDCOV CALC-SCNC: -3.7 MMOL/L (ref -30–30)
BENZODIAZ UR QL SCN: NEGATIVE
BLD GP AB SCN SERPL QL: NEGATIVE
BUPRENORPHINE SERPL-MCNC: NEGATIVE NG/ML
CANNABINOIDS SERPL QL: POSITIVE
COCAINE UR QL: NEGATIVE
DEPRECATED RDW RBC AUTO: 46.5 FL (ref 37–54)
ERYTHROCYTE [DISTWIDTH] IN BLOOD BY AUTOMATED COUNT: 14.4 % (ref 12.3–15.4)
FENTANYL UR-MCNC: NEGATIVE NG/ML
HCO3 BLDCOA-SCNC: 24.8 MMOL/L
HCO3 BLDCOV-SCNC: 21.2 MMOL/L
HCT VFR BLD AUTO: 34.6 % (ref 34–46.6)
HGB BLD-MCNC: 11.8 G/DL (ref 12–15.9)
MCH RBC QN AUTO: 30.5 PG (ref 26.6–33)
MCHC RBC AUTO-ENTMCNC: 34.1 G/DL (ref 31.5–35.7)
MCV RBC AUTO: 89.4 FL (ref 79–97)
METHADONE UR QL SCN: NEGATIVE
NOTIFIED WHO: NORMAL
OPIATES UR QL: NEGATIVE
OXYCODONE UR QL SCN: NEGATIVE
PCO2 BLDCOA: 48.2 MMHG (ref 33–49)
PCO2 BLDCOV: 37.1 MM HG (ref 35–51.3)
PCP UR QL SCN: NEGATIVE
PH BLDCOA: 7.32 PH UNITS (ref 7.18–7.34)
PH BLDCOV: 7.36 PH UNITS (ref 7.26–7.4)
PLATELET # BLD AUTO: 203 10*3/MM3 (ref 140–450)
PMV BLD AUTO: 10.6 FL (ref 6–12)
PO2 BLDCOA: 16 MMHG
PO2 BLDCOV: 33.2 MM HG (ref 19–39)
RBC # BLD AUTO: 3.87 10*6/MM3 (ref 3.77–5.28)
RH BLD: POSITIVE
RPR SER QL: ABNORMAL
RPR SER-TITR: ABNORMAL {TITER}
SAO2 % BLDCOA: 18.6 %
SAO2 % BLDCOV: 62.1 %
T&S EXPIRATION DATE: NORMAL
TREPONEMA PALLIDUM IGG+IGM AB [PRESENCE] IN SERUM OR PLASMA BY IMMUNOASSAY: REACTIVE
TRICYCLICS UR QL SCN: NEGATIVE
WBC NRBC COR # BLD AUTO: 10.09 10*3/MM3 (ref 3.4–10.8)

## 2025-05-23 PROCEDURE — 86593 SYPHILIS TEST NON-TREP QUANT: CPT | Performed by: STUDENT IN AN ORGANIZED HEALTH CARE EDUCATION/TRAINING PROGRAM

## 2025-05-23 PROCEDURE — 86850 RBC ANTIBODY SCREEN: CPT | Performed by: STUDENT IN AN ORGANIZED HEALTH CARE EDUCATION/TRAINING PROGRAM

## 2025-05-23 PROCEDURE — 82803 BLOOD GASES ANY COMBINATION: CPT

## 2025-05-23 PROCEDURE — 86592 SYPHILIS TEST NON-TREP QUAL: CPT | Performed by: STUDENT IN AN ORGANIZED HEALTH CARE EDUCATION/TRAINING PROGRAM

## 2025-05-23 PROCEDURE — 94799 UNLISTED PULMONARY SVC/PX: CPT

## 2025-05-23 PROCEDURE — 25810000003 SODIUM CHLORIDE 0.9 % SOLUTION: Performed by: STUDENT IN AN ORGANIZED HEALTH CARE EDUCATION/TRAINING PROGRAM

## 2025-05-23 PROCEDURE — 59515 CESAREAN DELIVERY: CPT | Performed by: STUDENT IN AN ORGANIZED HEALTH CARE EDUCATION/TRAINING PROGRAM

## 2025-05-23 PROCEDURE — 86780 TREPONEMA PALLIDUM: CPT | Performed by: STUDENT IN AN ORGANIZED HEALTH CARE EDUCATION/TRAINING PROGRAM

## 2025-05-23 PROCEDURE — 86901 BLOOD TYPING SEROLOGIC RH(D): CPT | Performed by: STUDENT IN AN ORGANIZED HEALTH CARE EDUCATION/TRAINING PROGRAM

## 2025-05-23 PROCEDURE — 80307 DRUG TEST PRSMV CHEM ANLYZR: CPT | Performed by: STUDENT IN AN ORGANIZED HEALTH CARE EDUCATION/TRAINING PROGRAM

## 2025-05-23 PROCEDURE — 25010000002 BUPIVACAINE PF 0.75 % SOLUTION: Performed by: REGISTERED NURSE

## 2025-05-23 PROCEDURE — 25810000003 LACTATED RINGERS SOLUTION: Performed by: STUDENT IN AN ORGANIZED HEALTH CARE EDUCATION/TRAINING PROGRAM

## 2025-05-23 PROCEDURE — 25010000002 OXYTOCIN PER 10 UNITS: Performed by: STUDENT IN AN ORGANIZED HEALTH CARE EDUCATION/TRAINING PROGRAM

## 2025-05-23 PROCEDURE — 25010000002 KETOROLAC TROMETHAMINE PER 15 MG: Performed by: STUDENT IN AN ORGANIZED HEALTH CARE EDUCATION/TRAINING PROGRAM

## 2025-05-23 PROCEDURE — 25010000002 ONDANSETRON PER 1 MG: Performed by: REGISTERED NURSE

## 2025-05-23 PROCEDURE — 25810000003 LACTATED RINGERS PER 1000 ML: Performed by: REGISTERED NURSE

## 2025-05-23 PROCEDURE — 25010000002 MIDAZOLAM PER 1MG: Performed by: REGISTERED NURSE

## 2025-05-23 PROCEDURE — 85027 COMPLETE CBC AUTOMATED: CPT | Performed by: STUDENT IN AN ORGANIZED HEALTH CARE EDUCATION/TRAINING PROGRAM

## 2025-05-23 PROCEDURE — 25010000002 CEFAZOLIN PER 500 MG: Performed by: STUDENT IN AN ORGANIZED HEALTH CARE EDUCATION/TRAINING PROGRAM

## 2025-05-23 PROCEDURE — 86900 BLOOD TYPING SEROLOGIC ABO: CPT | Performed by: STUDENT IN AN ORGANIZED HEALTH CARE EDUCATION/TRAINING PROGRAM

## 2025-05-23 PROCEDURE — 25010000002 MORPHINE PER 10 MG: Performed by: REGISTERED NURSE

## 2025-05-23 PROCEDURE — 25010000002 OXYTOCIN PER 10 UNITS: Performed by: REGISTERED NURSE

## 2025-05-23 PROCEDURE — 25810000003 LACTATED RINGERS PER 1000 ML: Performed by: STUDENT IN AN ORGANIZED HEALTH CARE EDUCATION/TRAINING PROGRAM

## 2025-05-23 DEVICE — SEAL HEMO SURG ARISTA/AH ABS/PWDR 3GM: Type: IMPLANTABLE DEVICE | Site: ABDOMEN | Status: FUNCTIONAL

## 2025-05-23 RX ORDER — HYDROMORPHONE HYDROCHLORIDE 2 MG/ML
0.5 INJECTION, SOLUTION INTRAMUSCULAR; INTRAVENOUS; SUBCUTANEOUS
Status: DISCONTINUED | OUTPATIENT
Start: 2025-05-23 | End: 2025-05-25 | Stop reason: HOSPADM

## 2025-05-23 RX ORDER — SIMETHICONE 80 MG
80 TABLET,CHEWABLE ORAL 4 TIMES DAILY PRN
Status: DISCONTINUED | OUTPATIENT
Start: 2025-05-23 | End: 2025-05-25 | Stop reason: HOSPADM

## 2025-05-23 RX ORDER — OXYTOCIN/0.9 % SODIUM CHLORIDE 30/500 ML
250 PLASTIC BAG, INJECTION (ML) INTRAVENOUS CONTINUOUS
Status: ACTIVE | OUTPATIENT
Start: 2025-05-23 | End: 2025-05-23

## 2025-05-23 RX ORDER — NALOXONE HCL 0.4 MG/ML
0.4 VIAL (ML) INJECTION ONCE AS NEEDED
Status: ACTIVE | OUTPATIENT
Start: 2025-05-23 | End: 2025-05-24

## 2025-05-23 RX ORDER — KETOROLAC TROMETHAMINE 30 MG/ML
30 INJECTION, SOLUTION INTRAMUSCULAR; INTRAVENOUS ONCE
Status: COMPLETED | OUTPATIENT
Start: 2025-05-23 | End: 2025-05-23

## 2025-05-23 RX ORDER — LIDOCAINE HYDROCHLORIDE 10 MG/ML
0.5 INJECTION, SOLUTION EPIDURAL; INFILTRATION; INTRACAUDAL; PERINEURAL ONCE AS NEEDED
Status: DISCONTINUED | OUTPATIENT
Start: 2025-05-23 | End: 2025-05-23 | Stop reason: HOSPADM

## 2025-05-23 RX ORDER — OXYCODONE HYDROCHLORIDE 5 MG/1
5 TABLET ORAL EVERY 4 HOURS PRN
Status: DISCONTINUED | OUTPATIENT
Start: 2025-05-23 | End: 2025-05-25 | Stop reason: HOSPADM

## 2025-05-23 RX ORDER — MORPHINE SULFATE 0.5 MG/ML
2 INJECTION, SOLUTION EPIDURAL; INTRATHECAL; INTRAVENOUS EVERY 4 HOURS PRN
Status: DISCONTINUED | OUTPATIENT
Start: 2025-05-23 | End: 2025-05-25 | Stop reason: HOSPADM

## 2025-05-23 RX ORDER — SODIUM CHLORIDE 0.9 % (FLUSH) 0.9 %
10 SYRINGE (ML) INJECTION AS NEEDED
Status: DISCONTINUED | OUTPATIENT
Start: 2025-05-23 | End: 2025-05-23 | Stop reason: HOSPADM

## 2025-05-23 RX ORDER — OXYCODONE HYDROCHLORIDE 5 MG/1
10 TABLET ORAL EVERY 4 HOURS PRN
Status: DISCONTINUED | OUTPATIENT
Start: 2025-05-23 | End: 2025-05-25 | Stop reason: HOSPADM

## 2025-05-23 RX ORDER — EPHEDRINE SULFATE 50 MG/ML
INJECTION, SOLUTION INTRAVENOUS AS NEEDED
Status: DISCONTINUED | OUTPATIENT
Start: 2025-05-23 | End: 2025-05-23 | Stop reason: SURG

## 2025-05-23 RX ORDER — OXYTOCIN/0.9 % SODIUM CHLORIDE 30/500 ML
125 PLASTIC BAG, INJECTION (ML) INTRAVENOUS CONTINUOUS PRN
Status: DISCONTINUED | OUTPATIENT
Start: 2025-05-23 | End: 2025-05-25 | Stop reason: HOSPADM

## 2025-05-23 RX ORDER — KETOROLAC TROMETHAMINE 30 MG/ML
15 INJECTION, SOLUTION INTRAMUSCULAR; INTRAVENOUS EVERY 6 HOURS
Status: DISPENSED | OUTPATIENT
Start: 2025-05-23 | End: 2025-05-24

## 2025-05-23 RX ORDER — DIPHENHYDRAMINE HCL 25 MG
25 CAPSULE ORAL EVERY 4 HOURS PRN
Status: DISCONTINUED | OUTPATIENT
Start: 2025-05-23 | End: 2025-05-25 | Stop reason: HOSPADM

## 2025-05-23 RX ORDER — OXYTOCIN 10 [USP'U]/ML
INJECTION, SOLUTION INTRAMUSCULAR; INTRAVENOUS AS NEEDED
Status: DISCONTINUED | OUTPATIENT
Start: 2025-05-23 | End: 2025-05-23 | Stop reason: SURG

## 2025-05-23 RX ORDER — ONDANSETRON 2 MG/ML
4 INJECTION INTRAMUSCULAR; INTRAVENOUS EVERY 6 HOURS PRN
Status: DISCONTINUED | OUTPATIENT
Start: 2025-05-23 | End: 2025-05-23 | Stop reason: HOSPADM

## 2025-05-23 RX ORDER — IBUPROFEN 800 MG/1
800 TABLET, FILM COATED ORAL EVERY 8 HOURS SCHEDULED
Status: DISCONTINUED | OUTPATIENT
Start: 2025-05-24 | End: 2025-05-25 | Stop reason: HOSPADM

## 2025-05-23 RX ORDER — ONDANSETRON 4 MG/1
4 TABLET, ORALLY DISINTEGRATING ORAL EVERY 6 HOURS PRN
Status: DISCONTINUED | OUTPATIENT
Start: 2025-05-23 | End: 2025-05-25 | Stop reason: HOSPADM

## 2025-05-23 RX ORDER — MORPHINE SULFATE 0.5 MG/ML
INJECTION, SOLUTION EPIDURAL; INTRATHECAL; INTRAVENOUS
Status: COMPLETED | OUTPATIENT
Start: 2025-05-23 | End: 2025-05-23

## 2025-05-23 RX ORDER — EPHEDRINE SULFATE 50 MG/ML
INJECTION INTRAVENOUS AS NEEDED
Status: DISCONTINUED | OUTPATIENT
Start: 2025-05-23 | End: 2025-05-23 | Stop reason: SURG

## 2025-05-23 RX ORDER — ACETAMINOPHEN 500 MG
1000 TABLET ORAL ONCE
Status: COMPLETED | OUTPATIENT
Start: 2025-05-23 | End: 2025-05-23

## 2025-05-23 RX ORDER — ACETAMINOPHEN 325 MG/1
650 TABLET ORAL EVERY 6 HOURS
Status: DISCONTINUED | OUTPATIENT
Start: 2025-05-24 | End: 2025-05-25 | Stop reason: HOSPADM

## 2025-05-23 RX ORDER — AMOXICILLIN 250 MG
1 CAPSULE ORAL 2 TIMES DAILY
Status: DISCONTINUED | OUTPATIENT
Start: 2025-05-23 | End: 2025-05-25 | Stop reason: HOSPADM

## 2025-05-23 RX ORDER — ACETAMINOPHEN 325 MG/1
650 TABLET ORAL ONCE AS NEEDED
Status: DISCONTINUED | OUTPATIENT
Start: 2025-05-23 | End: 2025-05-23 | Stop reason: HOSPADM

## 2025-05-23 RX ORDER — DEXMEDETOMIDINE HYDROCHLORIDE 100 UG/ML
INJECTION, SOLUTION INTRAVENOUS AS NEEDED
Status: DISCONTINUED | OUTPATIENT
Start: 2025-05-23 | End: 2025-05-23 | Stop reason: SURG

## 2025-05-23 RX ORDER — SODIUM CHLORIDE, SODIUM LACTATE, POTASSIUM CHLORIDE, CALCIUM CHLORIDE 600; 310; 30; 20 MG/100ML; MG/100ML; MG/100ML; MG/100ML
150 INJECTION, SOLUTION INTRAVENOUS CONTINUOUS
Status: DISCONTINUED | OUTPATIENT
Start: 2025-05-23 | End: 2025-05-23

## 2025-05-23 RX ORDER — ONDANSETRON 4 MG/1
4 TABLET, ORALLY DISINTEGRATING ORAL EVERY 6 HOURS PRN
Status: DISCONTINUED | OUTPATIENT
Start: 2025-05-23 | End: 2025-05-23 | Stop reason: HOSPADM

## 2025-05-23 RX ORDER — DIPHENHYDRAMINE HYDROCHLORIDE 50 MG/ML
12.5 INJECTION, SOLUTION INTRAMUSCULAR; INTRAVENOUS EVERY 6 HOURS PRN
Status: DISCONTINUED | OUTPATIENT
Start: 2025-05-23 | End: 2025-05-23 | Stop reason: SDUPTHER

## 2025-05-23 RX ORDER — ONDANSETRON 2 MG/ML
INJECTION INTRAMUSCULAR; INTRAVENOUS AS NEEDED
Status: DISCONTINUED | OUTPATIENT
Start: 2025-05-23 | End: 2025-05-23 | Stop reason: SURG

## 2025-05-23 RX ORDER — DIPHENHYDRAMINE HCL 25 MG
25 CAPSULE ORAL EVERY 6 HOURS PRN
Status: DISCONTINUED | OUTPATIENT
Start: 2025-05-23 | End: 2025-05-23 | Stop reason: SDUPTHER

## 2025-05-23 RX ORDER — HYDROCORTISONE 25 MG/G
CREAM TOPICAL 3 TIMES DAILY PRN
Status: DISCONTINUED | OUTPATIENT
Start: 2025-05-23 | End: 2025-05-25 | Stop reason: HOSPADM

## 2025-05-23 RX ORDER — NALOXONE HCL 0.4 MG/ML
0.4 VIAL (ML) INJECTION
Status: DISCONTINUED | OUTPATIENT
Start: 2025-05-23 | End: 2025-05-25 | Stop reason: HOSPADM

## 2025-05-23 RX ORDER — ACETAMINOPHEN 500 MG
1000 TABLET ORAL EVERY 6 HOURS
Status: COMPLETED | OUTPATIENT
Start: 2025-05-23 | End: 2025-05-24

## 2025-05-23 RX ORDER — FAMOTIDINE 10 MG/ML
20 INJECTION, SOLUTION INTRAVENOUS ONCE AS NEEDED
Status: DISCONTINUED | OUTPATIENT
Start: 2025-05-23 | End: 2025-05-23 | Stop reason: HOSPADM

## 2025-05-23 RX ORDER — OXYTOCIN/0.9 % SODIUM CHLORIDE 30/500 ML
999 PLASTIC BAG, INJECTION (ML) INTRAVENOUS ONCE
Status: COMPLETED | OUTPATIENT
Start: 2025-05-23 | End: 2025-05-23

## 2025-05-23 RX ORDER — CALCIUM CARBONATE 500 MG/1
1 TABLET, CHEWABLE ORAL EVERY 4 HOURS PRN
Status: DISCONTINUED | OUTPATIENT
Start: 2025-05-23 | End: 2025-05-25 | Stop reason: HOSPADM

## 2025-05-23 RX ORDER — HYDROMORPHONE HYDROCHLORIDE 2 MG/ML
0.25 INJECTION, SOLUTION INTRAMUSCULAR; INTRAVENOUS; SUBCUTANEOUS
Status: DISCONTINUED | OUTPATIENT
Start: 2025-05-23 | End: 2025-05-25 | Stop reason: HOSPADM

## 2025-05-23 RX ORDER — SODIUM CHLORIDE 0.9 % (FLUSH) 0.9 %
10 SYRINGE (ML) INJECTION EVERY 12 HOURS SCHEDULED
Status: DISCONTINUED | OUTPATIENT
Start: 2025-05-23 | End: 2025-05-23 | Stop reason: HOSPADM

## 2025-05-23 RX ORDER — MIDAZOLAM HYDROCHLORIDE 2 MG/2ML
INJECTION, SOLUTION INTRAMUSCULAR; INTRAVENOUS AS NEEDED
Status: DISCONTINUED | OUTPATIENT
Start: 2025-05-23 | End: 2025-05-23 | Stop reason: SURG

## 2025-05-23 RX ORDER — BUPIVACAINE HCL/0.9 % NACL/PF 0.125 %
PLASTIC BAG, INJECTION (ML) EPIDURAL AS NEEDED
Status: DISCONTINUED | OUTPATIENT
Start: 2025-05-23 | End: 2025-05-23 | Stop reason: SURG

## 2025-05-23 RX ORDER — ALUMINA, MAGNESIA, AND SIMETHICONE 2400; 2400; 240 MG/30ML; MG/30ML; MG/30ML
15 SUSPENSION ORAL EVERY 4 HOURS PRN
Status: DISCONTINUED | OUTPATIENT
Start: 2025-05-23 | End: 2025-05-25 | Stop reason: HOSPADM

## 2025-05-23 RX ORDER — SODIUM CHLORIDE, SODIUM LACTATE, POTASSIUM CHLORIDE, CALCIUM CHLORIDE 600; 310; 30; 20 MG/100ML; MG/100ML; MG/100ML; MG/100ML
INJECTION, SOLUTION INTRAVENOUS CONTINUOUS PRN
Status: DISCONTINUED | OUTPATIENT
Start: 2025-05-23 | End: 2025-05-23 | Stop reason: SURG

## 2025-05-23 RX ORDER — ONDANSETRON 2 MG/ML
4 INJECTION INTRAMUSCULAR; INTRAVENOUS EVERY 6 HOURS PRN
Status: DISCONTINUED | OUTPATIENT
Start: 2025-05-23 | End: 2025-05-25 | Stop reason: HOSPADM

## 2025-05-23 RX ORDER — FAMOTIDINE 20 MG/1
20 TABLET, FILM COATED ORAL ONCE AS NEEDED
Status: DISCONTINUED | OUTPATIENT
Start: 2025-05-23 | End: 2025-05-23 | Stop reason: HOSPADM

## 2025-05-23 RX ORDER — CITRIC ACID/SODIUM CITRATE 334-500MG
30 SOLUTION, ORAL ORAL ONCE
Status: DISCONTINUED | OUTPATIENT
Start: 2025-05-23 | End: 2025-05-23 | Stop reason: HOSPADM

## 2025-05-23 RX ORDER — BUPIVACAINE HYDROCHLORIDE 7.5 MG/ML
INJECTION, SOLUTION EPIDURAL; RETROBULBAR
Status: COMPLETED | OUTPATIENT
Start: 2025-05-23 | End: 2025-05-23

## 2025-05-23 RX ORDER — SODIUM CHLORIDE 9 MG/ML
40 INJECTION, SOLUTION INTRAVENOUS AS NEEDED
Status: DISCONTINUED | OUTPATIENT
Start: 2025-05-23 | End: 2025-05-23 | Stop reason: HOSPADM

## 2025-05-23 RX ADMIN — KETOROLAC TROMETHAMINE 15 MG: 30 INJECTION, SOLUTION INTRAMUSCULAR; INTRAVENOUS at 22:56

## 2025-05-23 RX ADMIN — OXYTOCIN 999 ML/HR: 10 INJECTION, SOLUTION INTRAMUSCULAR; INTRAVENOUS at 12:24

## 2025-05-23 RX ADMIN — BUPIVACAINE HYDROCHLORIDE 1.4 ML: 7.5 INJECTION, SOLUTION EPIDURAL; RETROBULBAR at 09:54

## 2025-05-23 RX ADMIN — SODIUM CHLORIDE, POTASSIUM CHLORIDE, SODIUM LACTATE AND CALCIUM CHLORIDE 150 ML/HR: 600; 310; 30; 20 INJECTION, SOLUTION INTRAVENOUS at 09:06

## 2025-05-23 RX ADMIN — ACETAMINOPHEN 1000 MG: 500 TABLET ORAL at 20:06

## 2025-05-23 RX ADMIN — EPHEDRINE SULFATE 30 MG: 50 INJECTION INTRAVENOUS at 10:03

## 2025-05-23 RX ADMIN — MIDAZOLAM HYDROCHLORIDE 1 MG: 1 INJECTION, SOLUTION INTRAMUSCULAR; INTRAVENOUS at 10:28

## 2025-05-23 RX ADMIN — SODIUM CHLORIDE 2 G: 9 INJECTION, SOLUTION INTRAVENOUS at 09:31

## 2025-05-23 RX ADMIN — SODIUM CHLORIDE, POTASSIUM CHLORIDE, SODIUM LACTATE AND CALCIUM CHLORIDE 1000 ML: 600; 310; 30; 20 INJECTION, SOLUTION INTRAVENOUS at 08:05

## 2025-05-23 RX ADMIN — ACETAMINOPHEN 1000 MG: 500 TABLET ORAL at 09:31

## 2025-05-23 RX ADMIN — DEXMEDETOMIDINE 7 MCG: 100 INJECTION, SOLUTION, CONCENTRATE INTRAVENOUS at 09:54

## 2025-05-23 RX ADMIN — EPHEDRINE SULFATE 10 MG: 50 INJECTION INTRAVENOUS at 10:02

## 2025-05-23 RX ADMIN — KETOROLAC TROMETHAMINE 15 MG: 30 INJECTION, SOLUTION INTRAMUSCULAR; INTRAVENOUS at 18:00

## 2025-05-23 RX ADMIN — SENNOSIDES AND DOCUSATE SODIUM 1 TABLET: 50; 8.6 TABLET ORAL at 20:07

## 2025-05-23 RX ADMIN — KETOROLAC TROMETHAMINE 30 MG: 30 INJECTION, SOLUTION INTRAMUSCULAR; INTRAVENOUS at 11:14

## 2025-05-23 RX ADMIN — DEXMEDETOMIDINE 10 MCG: 100 INJECTION, SOLUTION, CONCENTRATE INTRAVENOUS at 10:34

## 2025-05-23 RX ADMIN — ONDANSETRON 8 MG: 2 INJECTION INTRAMUSCULAR; INTRAVENOUS at 09:51

## 2025-05-23 RX ADMIN — ACETAMINOPHEN 1000 MG: 500 TABLET ORAL at 15:00

## 2025-05-23 RX ADMIN — MIDAZOLAM HYDROCHLORIDE 1 MG: 1 INJECTION, SOLUTION INTRAMUSCULAR; INTRAVENOUS at 10:17

## 2025-05-23 RX ADMIN — MORPHINE SULFATE 0.1 MG: 0.5 INJECTION, SOLUTION EPIDURAL; INTRATHECAL; INTRAVENOUS at 09:54

## 2025-05-23 RX ADMIN — Medication 100 MCG: at 10:02

## 2025-05-23 RX ADMIN — OXYTOCIN 40 UNITS: 10 INJECTION, SOLUTION INTRAMUSCULAR; INTRAVENOUS at 10:13

## 2025-05-23 RX ADMIN — DEXMEDETOMIDINE 10 MCG: 100 INJECTION, SOLUTION, CONCENTRATE INTRAVENOUS at 10:28

## 2025-05-23 RX ADMIN — SODIUM CHLORIDE, POTASSIUM CHLORIDE, SODIUM LACTATE AND CALCIUM CHLORIDE: 600; 310; 30; 20 INJECTION, SOLUTION INTRAVENOUS at 10:13

## 2025-05-23 NOTE — OP NOTE
SECTION REPEAT  Procedure Report    Patient Name:  Edwina Disla  YOB: 2000    Date of Surgery:  2025     Indications: History of  section x 2    Pre-op Diagnosis:   39 weeks 3 days gestation  History of  section x 2       Post-Op Diagnosis Codes:  Same as above    Procedure/CPT® Codes:  No CPT Code Applied in Case Entry    Procedure(s):   SECTION REPEAT        Staff:  Surgeon(s):  Alba Henao DO Hendrick, Tina R, MD         Anesthesia: Choice    Estimated Blood Loss: 600 cc    Implants:    Nothing was implanted during the procedure    Specimen:          None        Findings: Normal-appearing uterus, bilateral fallopian tubes and ovaries, live male infant    Complications: None    Description of Procedure:   The patient was taken to the operating room where a time out was performed to confirm correct patient and correct procedure. Ancef 2g was administered and spinal anesthesia established. The patient was then placed in supine position with left lateral uterine displacement. A Taylor catheter was inserted into the bladder with return of clear yellow urine. The patient was then prepped and draped in the normal sterile fashion for a low transverse skin incision. An incision was made in the skin with a surgical scalpel. Sharp and blunt dissection was used to dissect over subsequent layers of tissue. The fascia was incised at midline and the fascial incision was extended bilaterally using blunt and sharp dissection with Menendez scissors.  The superior edge of the fascia was grasped and dissected off of the rectus muscles using blunt and sharp dissection with Menendez scissors.  The inferior edge of the fascia was then grasped and dissected off using blunt and sharp dissection with Menendez scissors.  The rectus muscles were then divided at midline. The peritoneum was identified and bluntly entered at its superior margin taking care to avoid at the bladder. The bladder  blade was inserted. A transverse incision was made in the lower uterine segment using the scalpel. The uterine incision was extended in a cephalocaudad direction using blunt dissection. The amniotic sac was entered and the amniotic fluid was noted to be clear. The surgeon's hand was placed into the uterine cavity. The fetal head was identified elevated into the abdomen and delivered through the uterine incision with the assistance of fundal pressure. The infant was examined for nuchal cord. Nuchal cord x1 identified and reduced. The infant was then delivered with traction and the assistance of fundal pressure. The infant's oral and nasal passages were bulb suction.  Vigorous cry was noted at delivery.  On delivery, the cord was clamped x2 and cut. The infant was then passed off the table to the  team for further care. Cord gases and cord blood were obtained. The placenta delivered intact with manual expression with a three-vessel cord. Oxytocin was administered by IV infusion to enhance uterine contraction. The uterus was exteriorized and cleared of all clots and remaining products of conception. The uterine incision was reapproximated using 0 Vicryl in a running locked fashion. Non-hemostatic areas were reinforced using 0 Monocryl in figure-of-eight stitches.  The pelvis was irrigated and suctioned of all remaining blood clots.  The uterus was replaced back into the abdomen without difficulties.  Final inspection of hysterotomy reveals good hemostasis.  Nae was applied to the surgical site.  The fascia was reapproximated using 0 Vicryl in a running nonlocked fashion.  The subcutaneous tissue was irrigated and suction.  Hemostasis achieved with Bovie electrocautery.  The subcutaneous adipose tissue was closed with a running horizontal stitch using 3-0 plain gut suture. The skin was reapproximated using 4-0 Vicryl in a running subcuticular stitch.  At the end of the procedure, the uterus was expressed for  any remaining blood clots.  The incision was covered with Provena dressing. All needle, sponge, and instrument counts were noted to be correct x2 at the end of the procedure. The patient tolerated the procedure well and was transferred to the recovery room in stable condition.       Dr. Sarah was responsible for performing the following activities: Retraction, Suction, and Delivery of Fetus and their skilled assistance was necessary for the success of this case.    Alba Henao,      Date: 5/23/2025  Time: 10:59 EDT

## 2025-05-23 NOTE — PLAN OF CARE
Goal Outcome Evaluation:  Plan of Care Reviewed With: patient        Progress: improving  Outcome Evaluation: Pt ambulated x2 post . Catheter to be removed at 1815. IV saline locked. Pain controlled with tylenol and toradol. SCDs in place. Incentive spirometer education provided.

## 2025-05-23 NOTE — ANESTHESIA PREPROCEDURE EVALUATION
Anesthesia Evaluation     Patient summary reviewed and Nursing notes reviewed   no history of anesthetic complications:   NPO Solid Status: > 8 hours  NPO Liquid Status: > 2 hours           Airway   Mallampati: II  TM distance: >3 FB  Dental - normal exam     Pulmonary - negative pulmonary ROS and normal exam   Cardiovascular - negative cardio ROS and normal exam  Exercise tolerance: good (4-7 METS)        Neuro/Psych- negative ROS  GI/Hepatic/Renal/Endo    (+) GERD well controlled    Musculoskeletal (-) negative ROS    Abdominal    Substance History - negative use     OB/GYN    (+) Pregnant        Other - negative ROS       ROS/Med Hx Other: PAT Nursing Notes unavailable.               Anesthesia Plan    ASA 2     spinal       Anesthetic plan, risks, benefits, and alternatives have been provided, discussed and informed consent has been obtained with: patient.  Pre-procedure education provided  Plan discussed with CRNA.    CODE STATUS:    Code Status (Patient has no pulse and is not breathing): CPR (Attempt to Resuscitate)  Medical Interventions (Patient has pulse or is breathing): Full

## 2025-05-23 NOTE — H&P
HIRO Jordan  Obstetric History and Physical    Chief Complaint   Patient presents with    Scheduled      Repeat C/S        Subjective     Patient is a 25 y.o. female  currently at 39w3d, who presents for scheduled repeat  section.    Her prenatal care is complicated by history of  section x 2, syphilis in pregnancy status posttreatment, fetal renal pelvis dilation.  Her previous obstetric/gynecological history is noted for is non-contributory.    The following portions of the patients history were reviewed and updated as appropriate: current medications, allergies, past medical history, past surgical history, past family history, past social history, and problem list .       Prenatal Information:  Prenatal Results       Initial Prenatal Labs       Test Value Reference Range Date Time    Hemoglobin  12.3 g/dL 12.0 - 15.9 24 1017    Hematocrit  36.5 % 34.0 - 46.6 24 1017    Platelets  265 10*3/mm3 140 - 450 24 1017    Rubella IgG  1.98 index Immune >0.99 24 1017    Hepatitis B SAg  Non-Reactive  Non-Reactive 24 1017    Hepatitis C Ab  Non-Reactive  Non-Reactive 24 1017    RPR  Weakly Reactive  Non-Reactive 24 1017    T. Pallidum Ab   Reactive  Non Reactive 24 1017    ABO  A   24 1017    Rh  Positive   24 1017    Antibody Screen  Negative   24 1017    HIV  Non-Reactive  Non-Reactive 24 1017    Urine Culture  <10,000 CFU/mL Normal Urogenital Maria A   25 1654       No growth   25 1117       >100,000 CFU/mL Escherichia coli   24 1018    Gonorrhea  Not Detected  Not Detected  24 1018    Chlamydia  Not Detected  Not Detected  24 1018    TSH        HgB A1c   4.90 % 4.80 - 5.60 24 1017    Varicella IgG        Hemoglobinopathy Fractionation  Comment   24 1017    Hemoglobinopathy (genetic testing)        Cystic fibrosis   Negative   24 1017    Spinal muscular atrophy  Negative    12/12/24 1017    Fragile X                  Fetal testing        Test Value Reference Range Date Time    NIPT        MSAFP        AFP-4                  2nd and 3rd Trimester       Test Value Reference Range Date Time    Hemoglobin (repeated)  11.9 g/dL 12.0 - 15.9 02/11/25 1130    Hematocrit (repeated)  35.1 % 34.0 - 46.6 02/11/25 1130    Platelets   252 10*3/mm3 140 - 450 02/11/25 1130       265 10*3/mm3 140 - 450 12/12/24 1017    1 hour GTT         Antibody Screen (repeated)        3rd TM syphilis scrn (repeated)  RPR         3rd TM syphilis scrn (repeated) TP-Ab        3rd TM syphilis screen TB-Ab (FTA)        Syphilis cascade test TP-Ab (EIA)        Syphilis cascade TPPA        GTT Fasting        GTT 1 Hr        GTT 2 Hr        GTT 3 Hr        Group B Strep  Negative  Negative 04/23/25 1343              Other testing        Test Value Reference Range Date Time    Parvo IgG         CMV IgG                   Drug Screening       Test Value Reference Range Date Time    Amphetamine Screen  Negative  Negative 12/12/24 1018    Barbiturate Screen  Negative  Negative 12/12/24 1018    Benzodiazepine Screen  Negative  Negative 12/12/24 1018    Methadone Screen  Negative  Negative 12/12/24 1018    Phencyclidine Screen  Negative  Negative 12/12/24 1018    Opiates Screen  Negative  Negative 12/12/24 1018    THC Screen  Positive  Negative 12/12/24 1018    Cocaine Screen  Negative  Negative 12/12/24 1018    Propoxyphene Screen        Buprenorphine Screen  Negative  Negative 12/12/24 1018    Methamphetamine Screen  Negative  Negative 12/12/24 1018    Oxycodone Screen  Negative  Negative 12/12/24 1018    Tricyclic Antidepressants Screen  Negative  Negative 12/12/24 1018              Legend    ^: Historical                          External Prenatal Results       Pregnancy Outside Results - Transcribed From Office Records - See Scanned Records For Details       Test Value Date Time    ABO  A  12/12/24 1017    Rh  Positive   24 1017    Antibody Screen  Negative  24 1017    Varicella IgG       Rubella  1.98 index 24 1017    Hgb  11.9 g/dL 25 1130       12.3 g/dL 24 1017    Hct  35.1 % 25 1130       36.5 % 24 1017    HgB A1c   4.90 % 24 1017    1h GTT       3h GTT Fasting       3h GTT 1 hour       3h GTT 2 hour       3h GTT 3 hour        Gonorrhea (discrete)  Not Detected  24 1018    Chlamydia (discrete)  Not Detected  24 1018    RPR  Weakly Reactive  24 1017    Syphils cascade: TP-Ab (FTA)  Reactive  24 1017    TP-Ab  Reactive  24 1017    TP-Ab (EIA)       TPPA  Reactive  25 1050    HBsAg  Non-Reactive  24 1017    Herpes Simplex Virus PCR       Herpes Simplex VIrus Culture       HIV  Non-Reactive  24 1017    Hep C RNA Quant PCR       Hep C Antibody  Non-Reactive  24 1017    AFP       NIPT       Cystic Fibrosis (Bess)  Negative  24 1017    Cystic Fibroisis        Spinal Muscular atrophy  Negative  24 1017    Fragile X       Group B Strep  Negative  25 1343    GBS Susceptibility to Clindamycin       GBS Susceptibility to Erythromycin       Fetal Fibronectin       Genetic Testing, Maternal Blood                 Drug Screening       Test Value Date Time    Urine Drug Screen       Amphetamine Screen  Negative  24 1018    Barbiturate Screen  Negative  24 1018    Benzodiazepine Screen  Negative  24 1018    Methadone Screen  Negative  24 1018    Phencyclidine Screen  Negative  24 1018    Opiates Screen  Negative  24 1018    THC Screen  Positive  24 1018    Cocaine Screen       Propoxyphene Screen       Buprenorphine Screen  Negative  24 1018    Methamphetamine Screen       Oxycodone Screen  Negative  24 1018    Tricyclic Antidepressants Screen  Negative  24 1018              Legend    ^: Historical                             Past OB History:     OB History     Para Term  AB Living   4 2 2 0 1 2   SAB IAB Ectopic Molar Multiple Live Births   1 0 0 0 0 2      # Outcome Date GA Lbr Hakan/2nd Weight Sex Type Anes PTL Lv   4 Current            3 SAB 24     SAB      2 Term 20 38w0d  3062 g (6 lb 12 oz) M CS-LTranv  N ALVA   1 Term 18 40w0d  3118 g (6 lb 14 oz) F CS-LTranv  N ALVA       Past Medical History: Past Medical History:   Diagnosis Date    Abnormal Pap smear of cervix       Past Surgical History Past Surgical History:   Procedure Laterality Date     SECTION      DENTAL PROCEDURE        Family History: Family History   Problem Relation Age of Onset    Breast cancer Neg Hx     Uterine cancer Neg Hx     Colon cancer Neg Hx     Ovarian cancer Neg Hx     Melanoma Neg Hx     Prostate cancer Neg Hx       Social History:  reports that she has been smoking cigarettes. She has been exposed to tobacco smoke. She has never used smokeless tobacco.   reports that she does not currently use alcohol.   reports no history of drug use.        General ROS: Pertinent items are noted in HPI    Objective       Vital Signs Range for the last 24 hours  Temperature:     Temp Source:     BP:     Pulse:     Respirations:     SPO2:     O2 Amount (l/min):     O2 Devices     Weight:       Physical Examination: General appearance - alert, well appearing, and in no distress  Mental status - alert, oriented to person, place, and time  Chest - no labored breathing  Abdomen - soft, nontender, nondistended, gravid  Extremities - peripheral pulses normal, no pedal edema        GBS is negative      Assessment & Plan       Normal pregnancy        Assessment:  1.  Intrauterine pregnancy at 39w3d gestation with reactive, reassuring fetal status.    2.  History of  section x 2, desires repeat  3.  Obstetrical history significant for is non-contributory.  4.  GBS status:   Group B Strep, DNA   Date Value Ref Range Status   2025 Negative Negative Final       Plan:  1.  Repeat  scheduled  2. Plan of care has been reviewed with patient and patient agrees.   3.  Risks, benefits of treatment plan have been discussed.  4.  All questions have been answered.    Risks and benefits and alternatives of surgery were discussed with patient.  Risks are not limited to anesthesia, bleeding, blood transfusion, infection, damage to surrounding organs, wound separation, re-operation, thromboembolic disease, death.        Alba Henao DO  2025  08:20 EDT

## 2025-05-23 NOTE — ANESTHESIA PROCEDURE NOTES
Spinal Block    Pre-sedation assessment completed: 5/23/2025 9:30 AM    Patient reassessed immediately prior to procedure    Patient location during procedure: OB  Start Time: 5/23/2025 9:52 AM  Stop Time: 5/23/2025 9:54 AM  Indication:at surgeon's request and post-op pain management  Performed By  CRNA/CAA: Anastasiia Tran CRNA  Preanesthetic Checklist  Completed: patient identified, IV checked, risks and benefits discussed, surgical consent, monitors and equipment checked, pre-op evaluation and timeout performed  Spinal Block Prep:  Patient Position:sitting  Sterile Tech:cap, gloves, mask and sterile barriers  Prep:Betadine  Patient Monitoring:blood pressure monitoring, continuous pulse oximetry and EKG    Spinal Block Procedure  Approach:midline  Guidance:landmark technique and palpation technique  Location:L3-L4  Needle Type:Pencan  Needle Gauge:24 G  Placement of Spinal needle event:cerebrospinal fluid aspirated  Paresthesia: no  Fluid Appearance:clear  Medications: bupivacaine PF (MARCAINE) injection 0.75% - Intrathecal   1.4 mL - 5/23/2025 9:54:00 AM  morphine PF (DURAMORPH) injection - Intrathecal   0.1 mg - 5/23/2025 9:54:00 AM   Post Assessment  Patient Tolerance:patient tolerated the procedure well with no apparent complications  Complications no  Additional Notes  Skin infiltration with Lidocaine 1%. Introducer inserted, followed by spinal needle. + CSF return. Intrathecal marcaine and PF duramorph/fentanyl injected (see eMAR). Spinal needle/introducer removed. Site clean/dry/intact.    Spinal placed with ease on first attempt, no redirecting

## 2025-05-24 LAB
BASOPHILS # BLD AUTO: 0.03 10*3/MM3 (ref 0–0.2)
BASOPHILS NFR BLD AUTO: 0.3 % (ref 0–1.5)
DEPRECATED RDW RBC AUTO: 48.3 FL (ref 37–54)
EOSINOPHIL # BLD AUTO: 0.15 10*3/MM3 (ref 0–0.4)
EOSINOPHIL NFR BLD AUTO: 1.4 % (ref 0.3–6.2)
ERYTHROCYTE [DISTWIDTH] IN BLOOD BY AUTOMATED COUNT: 14.4 % (ref 12.3–15.4)
HCT VFR BLD AUTO: 31 % (ref 34–46.6)
HGB BLD-MCNC: 10.3 G/DL (ref 12–15.9)
IMM GRANULOCYTES # BLD AUTO: 0.06 10*3/MM3 (ref 0–0.05)
IMM GRANULOCYTES NFR BLD AUTO: 0.6 % (ref 0–0.5)
LYMPHOCYTES # BLD AUTO: 2.83 10*3/MM3 (ref 0.7–3.1)
LYMPHOCYTES NFR BLD AUTO: 26.3 % (ref 19.6–45.3)
MCH RBC QN AUTO: 30.7 PG (ref 26.6–33)
MCHC RBC AUTO-ENTMCNC: 33.2 G/DL (ref 31.5–35.7)
MCV RBC AUTO: 92.3 FL (ref 79–97)
MONOCYTES # BLD AUTO: 0.8 10*3/MM3 (ref 0.1–0.9)
MONOCYTES NFR BLD AUTO: 7.4 % (ref 5–12)
NEUTROPHILS NFR BLD AUTO: 6.91 10*3/MM3 (ref 1.7–7)
NEUTROPHILS NFR BLD AUTO: 64 % (ref 42.7–76)
NRBC BLD AUTO-RTO: 0 /100 WBC (ref 0–0.2)
PLATELET # BLD AUTO: 172 10*3/MM3 (ref 140–450)
PMV BLD AUTO: 10.9 FL (ref 6–12)
RBC # BLD AUTO: 3.36 10*6/MM3 (ref 3.77–5.28)
WBC NRBC COR # BLD AUTO: 10.78 10*3/MM3 (ref 3.4–10.8)

## 2025-05-24 PROCEDURE — 25010000002 KETOROLAC TROMETHAMINE PER 15 MG: Performed by: STUDENT IN AN ORGANIZED HEALTH CARE EDUCATION/TRAINING PROGRAM

## 2025-05-24 PROCEDURE — 85025 COMPLETE CBC W/AUTO DIFF WBC: CPT | Performed by: STUDENT IN AN ORGANIZED HEALTH CARE EDUCATION/TRAINING PROGRAM

## 2025-05-24 RX ORDER — ACETAMINOPHEN 500 MG
1000 TABLET ORAL ONCE
Status: COMPLETED | OUTPATIENT
Start: 2025-05-24 | End: 2025-05-24

## 2025-05-24 RX ADMIN — OXYCODONE 5 MG: 5 TABLET ORAL at 12:41

## 2025-05-24 RX ADMIN — SENNOSIDES AND DOCUSATE SODIUM 1 TABLET: 50; 8.6 TABLET ORAL at 10:05

## 2025-05-24 RX ADMIN — ACETAMINOPHEN 1000 MG: 500 TABLET ORAL at 01:14

## 2025-05-24 RX ADMIN — ACETAMINOPHEN 1000 MG: 500 TABLET ORAL at 01:19

## 2025-05-24 RX ADMIN — ACETAMINOPHEN 1000 MG: 500 TABLET ORAL at 09:08

## 2025-05-24 RX ADMIN — ACETAMINOPHEN 650 MG: 325 TABLET ORAL at 15:55

## 2025-05-24 RX ADMIN — IBUPROFEN 800 MG: 800 TABLET, FILM COATED ORAL at 11:40

## 2025-05-24 RX ADMIN — MAGNESIUM HYDROXIDE 10 ML: 2400 SUSPENSION ORAL at 12:38

## 2025-05-24 RX ADMIN — KETOROLAC TROMETHAMINE 15 MG: 30 INJECTION, SOLUTION INTRAMUSCULAR; INTRAVENOUS at 05:07

## 2025-05-24 RX ADMIN — IBUPROFEN 800 MG: 800 TABLET, FILM COATED ORAL at 22:52

## 2025-05-24 RX ADMIN — SENNOSIDES AND DOCUSATE SODIUM 1 TABLET: 50; 8.6 TABLET ORAL at 21:45

## 2025-05-24 RX ADMIN — ACETAMINOPHEN 650 MG: 325 TABLET ORAL at 21:45

## 2025-05-24 RX ADMIN — OXYCODONE 10 MG: 5 TABLET ORAL at 19:49

## 2025-05-24 NOTE — PROGRESS NOTES
HIRO Jordan  [facility] Delivery Progress Note    Subjective   Postpartum Day 1: [facility] Delivery    The patient feels well.  Her pain is well controlled.   She is ambulating well.  Patient describes her bleeding as moderate lochia.  Patient is tolerating regular diet and urinating without difficulty.    Breastfeeding: declines.  Currently bottle feeding    Objective     Vital Signs Range for the last 24 hours  Temperature: Temp:  [97.5 °F (36.4 °C)-98.6 °F (37 °C)] 98.6 °F (37 °C)   Temp Source: Temp src: Oral   BP: BP: ()/(45-94) 99/50   Pulse: Heart Rate:  [61-79] 67   Respirations: Resp:  [16-19] 16     Physical Exam:  General:  no acute distress.  Abdomen: abdomen is soft without significant tenderness, masses, organomegaly or guarding.   Fundus: appropriate, firm, non tender, moderate lochia  Dressing: clean, dry and intact .  Proveena dressing in place.  Extremities: normal, atraumatic, no cyanosis, and trace edema.     Rubella:   Rubella Antibodies, IgG   Date Value Ref Range Status   12/12/2024 1.98 Immune >0.99 index Final     Comment:                                     Non-immune       <0.90                                  Equivocal  0.90 - 0.99                                  Immune           >0.99     Rh Status:    RH type   Date Value Ref Range Status   05/23/2025 Positive  Final     Immunizations:   Immunization History   Administered Date(s) Administered    DTaP 2000, 2000, 2000, 01/26/2002, 07/18/2005    FluMist 2-49yrs (Nasal) 01/29/2010, 10/09/2012    Fluzone (or Fluarix & Flulaval for VFC) >6mos 10/03/2014    Hep A, 2 Dose 03/12/2010, 07/19/2011    Hep B, Adolescent or Pediatric 2000, 2000, 05/03/2001    Hib (PRP-OMP) 2000, 2000, 05/03/2001, 10/17/2001    IPV 2000, 2000, 05/03/2001, 07/18/2005    Influenza Seasonal Injectable 10/22/2004, 10/11/2005    MMR 05/03/2001, 07/18/2005    Meningococcal MCV4P (Menactra) 07/19/2011    Tdap  2011, 2025    Varicella 2010, 2012                     Lab Results   Component Value Date    WBC 10.78 2025    HGB 10.3 (L) 2025    HCT 31.0 (L) 2025    MCV 92.3 2025     2025        Assessment & Plan       Normal pregnancy      Edwina Disla is Day 1  post-partum  , Low Transverse  .      Plan:  Continue current care.            Home POD #2 or 3 per patient request.    Ryann Yu MD      Electronically signed by Ryann Yu MD, 25, 9:07 AM EDT.

## 2025-05-24 NOTE — PLAN OF CARE
Goal Outcome Evaluation:              Outcome Evaluation: Pt up ad enrico. Fundus is firm, 1 below, with scant bleeding. Assessment and VS WNL. Wound vac sealed and in place.

## 2025-05-24 NOTE — PLAN OF CARE
Goal Outcome Evaluation:  Plan of Care Reviewed With: patient        Progress: improving  Outcome Evaluation: Pt is up ad enrico. Fundus is firm with light bleeding. Urine output has been adequate. Bottle feeding infant every 3 to 4 hours and bonding well. VSS. Pain is starting to increase but tylenol and toradol have been helping.

## 2025-05-24 NOTE — LACTATION NOTE
LC in for follow up on pumping. Mom states that she is pumping every 4 hours and not getting any colostrum out. She also states that she is feeding infant every 4 hours. LC educated on the importance of feeding and pumping more often to help prevent weight loss issues and for adequate hormone stimulation. LC offered to demonstrate hand pumping to help mom see some output. Mom states that she will pump again around noon. LC will check back to assess pump flange size and pumping technique.

## 2025-05-24 NOTE — ANESTHESIA POSTPROCEDURE EVALUATION
Patient: Edwina Disla    Procedure Summary       Date: 25 Room / Location: [facility] LABOR DELIVERY  [facility] LABOR DELIVERY    Anesthesia Start: 950 Anesthesia Stop:     Procedure:  SECTION REPEAT (Abdomen) Diagnosis:     Surgeons: Alba Henao DO Provider: Anastasiia Tran CRNA    Anesthesia Type: spinal ASA Status: 2            Anesthesia Type: spinal    Vitals  Vitals Value Taken Time   BP 98/58 25 10:10   Temp 36.7 °C (98.1 °F) 25 10:10   Pulse 76 25 10:10   Resp 16 25 10:10   SpO2 99 % 25 12:25           Post Anesthesia Care and Evaluation    Patient location during evaluation: bedside  Patient participation: complete - patient participated  Level of consciousness: awake  Pain management: adequate    Airway patency: patent  Anesthetic complications: No anesthetic complications  PONV Status: none  Cardiovascular status: acceptable and stable  Respiratory status: acceptable  Hydration status: acceptable  Post Neuraxial Block status: Motor and sensory function returned to baseline and No signs or symptoms of PDPH

## 2025-05-25 VITALS
SYSTOLIC BLOOD PRESSURE: 112 MMHG | DIASTOLIC BLOOD PRESSURE: 69 MMHG | HEART RATE: 74 BPM | TEMPERATURE: 98.4 F | HEIGHT: 63 IN | RESPIRATION RATE: 16 BRPM | WEIGHT: 147 LBS | OXYGEN SATURATION: 99 % | BODY MASS INDEX: 26.05 KG/M2

## 2025-05-25 RX ORDER — DOCUSATE SODIUM 100 MG/1
100 CAPSULE, LIQUID FILLED ORAL 2 TIMES DAILY PRN
Qty: 60 CAPSULE | Refills: 0 | Status: SHIPPED | OUTPATIENT
Start: 2025-05-25

## 2025-05-25 RX ORDER — OXYCODONE HYDROCHLORIDE 5 MG/1
5 TABLET ORAL EVERY 6 HOURS PRN
Qty: 7 TABLET | Refills: 0 | Status: SHIPPED | OUTPATIENT
Start: 2025-05-25

## 2025-05-25 RX ORDER — IBUPROFEN 800 MG/1
800 TABLET, FILM COATED ORAL EVERY 8 HOURS PRN
Qty: 30 TABLET | Refills: 1 | Status: SHIPPED | OUTPATIENT
Start: 2025-05-25

## 2025-05-25 RX ADMIN — ACETAMINOPHEN 650 MG: 325 TABLET ORAL at 03:36

## 2025-05-25 RX ADMIN — ACETAMINOPHEN 650 MG: 325 TABLET ORAL at 09:09

## 2025-05-25 RX ADMIN — SENNOSIDES AND DOCUSATE SODIUM 1 TABLET: 50; 8.6 TABLET ORAL at 09:09

## 2025-05-25 RX ADMIN — IBUPROFEN 800 MG: 800 TABLET, FILM COATED ORAL at 05:28

## 2025-05-25 NOTE — PLAN OF CARE
Goal Outcome Evaluation:   VSS. Mother and infant bonding well. Pain managed with motrin tylenol and occasional norco. Prevena wound vac in place. Voiding and ambulating appropriately.

## 2025-05-25 NOTE — DISCHARGE SUMMARY
[facility]  Delivery Discharge Summary    Patient Name: Edwina Disla  : 2000  MRN: [id number]    Date of Admission: 2025  Date of Discharge:  2025   Primary Care Physician: Stephanie Billings APRN  OB Clinician: Alba Henao DO    Procedures:  2025 - , Low Transverse     Admitting Diagnosis:  Normal pregnancy [Z34.90]  Normal pregnancy [Z34.90]    Discharge Diagnosis:  Same as Admitting plus:   Pregnancy at 39w3d - Delivered     Antepartum complications: Syphilis in pregnancy, renal pelvis dilation of fetus    Delivery:   Date of Delivery: 2025  Time of Delivery: 10:12 AM  Delivered By:  Alba Henao  Delivery Type: , Low Transverse   Anesthesia: Spinal   Intrapartum complications: None  Placenta: Expressed      Baby:  male infant;   Apgar:  8  @ 1 minute /   Apgar:  9  @ 5 minutes   Weight: 2920 g (6 lb 7 oz)   Length: 19.5    Feeding method: Breastfeeding Status: Yes    Discharge Details:     Discharge Medications        New Medications        Instructions Start Date   docusate sodium 100 MG capsule  Commonly known as: Colace   100 mg, Oral, 2 Times Daily PRN      ibuprofen 800 MG tablet  Commonly known as: ADVIL,MOTRIN   800 mg, Oral, Every 8 Hours PRN      oxyCODONE 5 MG immediate release tablet  Commonly known as: ROXICODONE   5 mg, Oral, Every 6 Hours PRN             Continue These Medications        Instructions Start Date   clotrimazole 1 % vaginal cream  Commonly known as: LOTRIMIN   Vaginal, Nightly, Insert one applicator of cream into vagina nightly for 7 nights      ONE TOUCH ULTRA 2 w/Device kit       OneTouch Delica Plus [id number] misc       OneTouch Ultra Test test strip  Generic drug: glucose blood       pantoprazole 20 MG EC tablet  Commonly known as: Protonix   20 mg, Oral, Daily      prenatal (CLASSIC) vitamin 28-0.8 MG tablet tablet  Generic drug: prenatal vitamin   Daily      vitamin B-6 25 MG tablet  Commonly known as: PYRIDOXINE   25 mg,  Oral, Daily               No Known Allergies    Discharge Disposition:   Home, self-care    Discharge Condition:  Good    Follow Up:  Future Appointments   Date Time Provider Department Center   6/6/2025 11:00 AM Alba Henao DO [facility] [address]         Plan:  Follow up 2 weeks for postpartum appt    Electronically signed by Alba Henao DO, 05/25/25, 8:35 AM EDT.

## 2025-05-25 NOTE — LACTATION NOTE
LC in to follow up with breastfeeding progress. Patient states she is planning on exclusive pumping. LC discussed importance of pumping with each infant feeding even through the night. Baby is much more energetic today and feeding much better. Patient is planning on discharge today. LC discussed normal infant output patterns to expect and if infant is not waking by 3 hours to wake and feed using measures shown in the hospital. LC discussed checking to make sure new medications are safe to breastfeed. LC discussed alcohol use and cigarette/second hand smoke around baby and breastfeeding and discussed the impact of street drugs on infants and breastfeeding. LC used the page in the breastfeeding guide to discuss harmful effects of these. Breastfeeding/Lactation expectations and anticipatory guidance discussed for the next two weeks . LC discussed nipple care, plugged ducts, engorgement, and breast infection. LC encouraged mom to see pediatrician two days from discharge for follow up. Patient has a breastpump for home use and LC discussed good pumping guidelines and normal expectations with pumping and storage and preparation of ebm for feedings. LC discussed breastfeeding/lactation resources including the local breastfeeding support group after discharge and when to call the doctor. Patient showed good understanding.

## 2025-05-25 NOTE — PROGRESS NOTES
Postpartum  Section       POD#2    Edwina Disla is a 25 y.o.  who underwent a  sectionRLTCS  She complains of appropriate pain per palpation that is controlled by pain meds. Patient is urinating appropriately.  Patient is eating without any N/V. Patient states lochia is light. Patient is ambulating without any difficulty. Patient is bottle feed, pumping      I/O last 3 completed shifts:  In: -   Out: 650 [Urine:650]  No intake/output data recorded.    Physical Exam:  HEENT:Normocephalic and atraumatic, NAD  Lungs: No labored breathing  Abdomen: Soft, appropriate tenderness to palpation, Uterine fundus firm and below the umbilicus  Wound:  Clean, Dry and Intact, negative drainage Provena dressing in place  Extremities: Negative swelling or cyanosis, negative clonus    Recent Results (from the past 48 hours)   Blood Gas, Arterial, Cord    Collection Time: 25 10:42 AM    Specimen: Umbilical Cord; Cord Blood Arterial   Result Value Ref Range    pH, Cord Arterial 7.32 7.18 - 7.34 pH Units    pCO2, Cord Arterial 48.2 33.0 - 49.0 mmHg    pO2, Cord Arterial 16.0 mmHg    HCO3, Cord Arterial 24.8 mmol/L    Base Exc, Cord Arterial -1.9 -2.0 - 2.0 mmol/L    O2 Sat, Cord Arterial 18.6 %    Barometric Pressure for Blood Gas 744.8000 mmHg    Notified Who viki    Blood Gas, Venous, Cord    Collection Time: 25 10:46 AM    Specimen: Umbilical Cord; Cord Blood Venous   Result Value Ref Range    pH, Cord Venous 7.364 7.260 - 7.400 pH Units    pCO2, Cord Venous 37.1 35.0 - 51.3 mm Hg    pO2, Cord Venous 33.2 19.0 - 39.0 mm Hg    HCO3, Cord Venous 21.2 mmol/L    Base Excess, Cord Venous -3.7 -30.0 - 30.0 mmol/L    O2 Sat, Cord Venous 62.1 %    Barometric Pressure for Blood Gas 745.8000 mmHg   CBC Auto Differential    Collection Time: 25  5:30 AM    Specimen: Blood   Result Value Ref Range    WBC 10.78 3.40 - 10.80 10*3/mm3    RBC 3.36 (L) 3.77 - 5.28 10*6/mm3    Hemoglobin 10.3 (L) 12.0 -  15.9 g/dL    Hematocrit 31.0 (L) 34.0 - 46.6 %    MCV 92.3 79.0 - 97.0 fL    MCH 30.7 26.6 - 33.0 pg    MCHC 33.2 31.5 - 35.7 g/dL    RDW 14.4 12.3 - 15.4 %    RDW-SD 48.3 37.0 - 54.0 fl    MPV 10.9 6.0 - 12.0 fL    Platelets 172 140 - 450 10*3/mm3    Neutrophil % 64.0 42.7 - 76.0 %    Lymphocyte % 26.3 19.6 - 45.3 %    Monocyte % 7.4 5.0 - 12.0 %    Eosinophil % 1.4 0.3 - 6.2 %    Basophil % 0.3 0.0 - 1.5 %    Immature Grans % 0.6 (H) 0.0 - 0.5 %    Neutrophils, Absolute 6.91 1.70 - 7.00 10*3/mm3    Lymphocytes, Absolute 2.83 0.70 - 3.10 10*3/mm3    Monocytes, Absolute 0.80 0.10 - 0.90 10*3/mm3    Eosinophils, Absolute 0.15 0.00 - 0.40 10*3/mm3    Basophils, Absolute 0.03 0.00 - 0.20 10*3/mm3    Immature Grans, Absolute 0.06 (H) 0.00 - 0.05 10*3/mm3    nRBC 0.0 0.0 - 0.2 /100 WBC   ]      Vitals:    05/25/25 0530   BP: 118/64   Pulse: 85   Resp: 18   Temp: 98.4 °F (36.9 °C)   SpO2:            ASSESSMENT/PLAN:    Patient is a 25 y.o.female who is POD# 2 s/p RCS  - Progressing as expected  - Vital signs per protocol  - Rh pos /Rubella immune  - bottle feed, pumping  - Continue pain regimen for pain control  - Encourage ambulation  - SCDs while in bed    Stable for DC home today. F/u in 2 weeks for incision check        Alba Henao DO

## 2025-05-30 ENCOUNTER — TELEPHONE (OUTPATIENT)
Dept: OBSTETRICS AND GYNECOLOGY | Age: 25
End: 2025-05-30

## 2025-05-30 NOTE — TELEPHONE ENCOUNTER
Caller: Edwina Disla    Relationship: Self    Best call back number: [phone] (home)     Requested Prescriptions:   ibuprofen (ADVIL,MOTRIN) 800 MG tablet      Pharmacy where request should be sent:    [facility] [id number]  [place] - [address] AT [facility] [address] - [phone] [facility] [phone] FX    Last office visit with prescribing clinician: 11/6/2024   Next office visit with prescribing clinician: 6/6/2025     Additional details provided by patient: HAS 2 LEFT.    Does the patient have less than a 3 day supply:  [x] Yes  [] No    Would you like a call back once the refill request has been completed: [x] Yes [] No    If the office needs to give you a call back, can they leave a voicemail: [x] Yes [] No    Katrina Lang   05/30/25 12:09 EDT

## 2025-06-02 ENCOUNTER — TELEPHONE (OUTPATIENT)
Dept: LACTATION | Facility: HOSPITAL | Age: 25
End: 2025-06-02
Payer: COMMERCIAL

## 2025-06-02 NOTE — TELEPHONE ENCOUNTER
JED called to check with this patient and her breastfeeding progress. Patient did not answer, so JED provided the lactation dept. number ([phone]) and encouraged her to call with any lactation questions or needs.

## 2025-06-03 ENCOUNTER — TELEPHONE (OUTPATIENT)
Dept: OBSTETRICS AND GYNECOLOGY | Age: 25
End: 2025-06-03

## 2025-06-03 NOTE — TELEPHONE ENCOUNTER
Caller: Edwina Disla    Relationship: Self    Best call back number: [phone]    What form or medical record are you requesting: FMLA/MATERNITY LEAVE - THEY WERE WANTING HER TO RETURN TO WORK FROM HER  ON , ON , WHICH PT WANTS TO GO BACK ON  (SO MAY NEED TO LOOK AT PAPERWORK ON THAT) - [employer] THEY FAXED IT TO OFFICE ON 25    Who is requesting this form or medical record from you: [employer]  HER EMPLOYER IS: LYNN    How would you like to receive the form or medical records (pick-up, mail, fax): FAX # IS ON THE PAPERWORK  If fax, what is the fax number:   If mail, what is the address:   If pick-up, provide patient with address and location details    Timeframe paperwork needed: ASAP    Additional notes: [employer]  PH# -[phone] IF NEEDED        .”

## 2025-06-04 ENCOUNTER — PATIENT OUTREACH (OUTPATIENT)
Dept: LABOR AND DELIVERY | Facility: HOSPITAL | Age: 25
End: 2025-06-04
Payer: COMMERCIAL

## 2025-06-04 NOTE — OUTREACH NOTE
[facility]    Postpartum EPDS sent via [email or web address].    Mile Romero RN  Maternity Nurse Navigator    6/4/2025, 14:31 EDT

## 2025-06-05 ENCOUNTER — PATIENT OUTREACH (OUTPATIENT)
Dept: LABOR AND DELIVERY | Facility: HOSPITAL | Age: 25
End: 2025-06-05
Payer: COMMERCIAL

## 2025-06-05 NOTE — OUTREACH NOTE
[facility]  Unable to Reach    Questions/Answers      Flowsheet Row Responses   Pending Outreach Postpartum Check-in   Call Attempt First   Outcome No answer/busy, Left message   Next Call Attempt Date 06/05/25            Mile Romero RN  Maternity Nurse Navigator    6/5/2025, 13:17 EDT

## 2025-06-06 ENCOUNTER — PATIENT OUTREACH (OUTPATIENT)
Dept: LABOR AND DELIVERY | Facility: HOSPITAL | Age: 25
End: 2025-06-06
Payer: COMMERCIAL

## 2025-06-06 ENCOUNTER — TELEPHONE (OUTPATIENT)
Dept: OBSTETRICS AND GYNECOLOGY | Age: 25
End: 2025-06-06
Payer: COMMERCIAL

## 2025-06-06 ENCOUNTER — POSTPARTUM VISIT (OUTPATIENT)
Dept: OBSTETRICS AND GYNECOLOGY | Age: 25
End: 2025-06-06
Payer: COMMERCIAL

## 2025-06-06 VITALS
WEIGHT: 138.6 LBS | SYSTOLIC BLOOD PRESSURE: 121 MMHG | DIASTOLIC BLOOD PRESSURE: 73 MMHG | HEART RATE: 67 BPM | HEIGHT: 63 IN | BODY MASS INDEX: 24.56 KG/M2

## 2025-06-06 NOTE — PROGRESS NOTES
"  POSTPARTUM Follow Up Visit      Chief Complaint   Patient presents with    Postpartum Care     Incision check     HPI:      Date of delivery: 2025  Delivery type:   LTCS            Delivering Provider:   Dr. Alba Henao      Feeding: Breast and bottle  Pain:  No  Vaginal Bleeding:  Yes  Depressed/Anxious:  No  EPDS score: 0   #10: 0    Last pap date and result:   Last Completed Pap Smear    This patient has no relevant Health Maintenance data.              Postpartum Depression: Low Risk  (2025)    [place]  Depression Scale     Last EPDS Total Score: 0     Last EPDS Self Harm Result: Never   Recent Concern: Postpartum Depression - Medium Risk (2025)    [place]  Depression Scale     Last EPDS Total Score: 5     Last EPDS Self Harm Result: Never       PHYSICAL EXAM:  /73   Pulse 67   Ht 160 cm (63\")   Wt 62.9 kg (138 lb 9.6 oz)   LMP 2024 (Exact Date)   Breastfeeding Yes   BMI 24.55 kg/m²  1.361 kg (3 lb)  General- NAD, alert and oriented, appropriate  Psych- Normal mood, good memory, good eye contact    INCISION : Clean, dry, intact, no evidence of infection  Abdomen- Soft, non distended, non tender, no masses  Ext- No edema    ASSESSMENT AND PLAN:  Diagnoses and all orders for this visit:    1. Postpartum care following  delivery (Primary)              Follow Up:  Return in about 4 weeks (around 2025) for Postpartum.          Alba Henao DO  2025    [facility] [address] [facility]  [address] [place] [address] [address] [place] [address]  Dept: [phone]  Dept Fax: [phone]  Loc: [phone]           "

## 2025-06-06 NOTE — OUTREACH NOTE
Motherhood Connection  Unable to Reach    Questions/Answers      Flowsheet Row Responses   Pending Outreach Postpartum Check-in   Call Attempt Third   Outcome No answer/busy, Left message, DVS [employer] message sent to patient            Mile Romero RN  Maternity Nurse Navigator    6/6/2025, 15:05 EDT

## 2025-06-06 NOTE — OUTREACH NOTE
Motherhood Connection  Unable to Reach    Questions/Answers      Flowsheet Row Responses   Pending Outreach Postpartum Check-in   Call Attempt Second   Outcome No answer/busy, Left message   Next Call Attempt Date 06/06/25            Mile Romero RN  Maternity Nurse Navigator    6/5/2025, 15:34 EDT

## 2025-06-06 NOTE — TELEPHONE ENCOUNTER
Caller: Edwina Disla    Relationship: Self    Best call back number: [phone]    What is the best time to reach you: ANY    Who are you requesting to speak with (clinical staff, provider,  specific staff member): CLINICAL    Do you know the name of the person who called:   SHAWN  What was the call regarding: FMLA PAPER WORK PAYMENT    PT STATED SHE HAS BEEN CALLING THE OFFICE AND IT SAYS TEMPORARILY NOT WORKING AND HANGS UP; UNABLE TO WT CALL. PLEASE CALL PT.

## 2025-06-13 ENCOUNTER — PATIENT OUTREACH (OUTPATIENT)
Dept: CALL CENTER | Facility: HOSPITAL | Age: 25
End: 2025-06-13
Payer: COMMERCIAL

## 2025-06-13 NOTE — OUTREACH NOTE
Motherhood Connection Survey      Flowsheet Row Responses   [facility] facility patient discharged from? [facility]   Week 1 attempt successful? No   Unsuccessful attempts Attempt 2   Reschedule Tomorrow              ADAM MARTINEZ - Registered Nurse

## 2025-06-13 NOTE — OUTREACH NOTE
Motherhood Connection Survey      Flowsheet Row Responses   [facility] facility patient discharged from? [facility]   Week 1 attempt successful? No  [c/s male]   Unsuccessful attempts Attempt 1  [left msg on vm]   Reschedule Today              ADAM MARTINEZ - Registered Nurse

## 2025-06-15 ENCOUNTER — PATIENT OUTREACH (OUTPATIENT)
Dept: CALL CENTER | Facility: HOSPITAL | Age: 25
End: 2025-06-15
Payer: COMMERCIAL

## 2025-06-15 NOTE — OUTREACH NOTE
Motherhood Connection Survey      Flowsheet Row Responses   [facility] facility patient discharged from? [facility]   Week 1 attempt successful? No   Unsuccessful attempts Attempt 3   Revoke Decline to participate              ADAM MARTINEZ - Registered Nurse

## 2025-07-14 ENCOUNTER — TELEPHONE (OUTPATIENT)
Dept: OBSTETRICS AND GYNECOLOGY | Age: 25
End: 2025-07-14

## 2025-07-17 ENCOUNTER — HOSPITAL ENCOUNTER (EMERGENCY)
Facility: HOSPITAL | Age: 25
Discharge: HOME | End: 2025-07-17
Attending: EMERGENCY MEDICINE
Payer: COMMERCIAL

## 2025-07-17 VITALS
OXYGEN SATURATION: 98 % | HEIGHT: 63 IN | DIASTOLIC BLOOD PRESSURE: 72 MMHG | SYSTOLIC BLOOD PRESSURE: 127 MMHG | HEART RATE: 122 BPM | BODY MASS INDEX: 25.69 KG/M2 | TEMPERATURE: 97.6 F | RESPIRATION RATE: 18 BRPM | WEIGHT: 145 LBS

## 2025-07-17 DIAGNOSIS — F10.920 ALCOHOLIC INTOXICATION WITHOUT COMPLICATION: Primary | ICD-10-CM

## 2025-07-17 LAB
ALBUMIN SERPL-MCNC: 5.4 G/DL (ref 3.5–5.2)
ALBUMIN/GLOB SERPL: 1.7 G/DL
ALP SERPL-CCNC: 90 U/L (ref 39–117)
ALT SERPL W P-5'-P-CCNC: 21 U/L (ref 1–33)
AMPHET+METHAMPHET UR QL: NEGATIVE
AMPHETAMINES UR QL: NEGATIVE
ANION GAP SERPL CALCULATED.3IONS-SCNC: 18.6 MMOL/L (ref 5–15)
APAP SERPL-MCNC: <5 MCG/ML (ref 0–30)
AST SERPL-CCNC: 22 U/L (ref 1–32)
BACTERIA UR QL AUTO: ABNORMAL /HPF
BARBITURATES UR QL SCN: NEGATIVE
BASOPHILS # BLD AUTO: 0.08 10*3/MM3 (ref 0–0.2)
BASOPHILS NFR BLD AUTO: 0.8 % (ref 0–1.5)
BENZODIAZ UR QL SCN: NEGATIVE
BILIRUB SERPL-MCNC: 0.3 MG/DL (ref 0–1.2)
BILIRUB UR QL STRIP: NEGATIVE
BUN SERPL-MCNC: 10.5 MG/DL (ref 6–20)
BUN/CREAT SERPL: 15.9 (ref 7–25)
BUPRENORPHINE SERPL-MCNC: NEGATIVE NG/ML
CALCIUM SPEC-SCNC: 9.3 MG/DL (ref 8.6–10.5)
CANNABINOIDS SERPL QL: POSITIVE
CHLORIDE SERPL-SCNC: 109 MMOL/L (ref 98–107)
CLARITY UR: ABNORMAL
CO2 SERPL-SCNC: 17.4 MMOL/L (ref 22–29)
COCAINE UR QL: NEGATIVE
COLOR UR: ABNORMAL
CREAT SERPL-MCNC: 0.66 MG/DL (ref 0.57–1)
DEPRECATED RDW RBC AUTO: 50.2 FL (ref 37–54)
EGFRCR SERPLBLD CKD-EPI 2021: 125 ML/MIN/1.73
EOSINOPHIL # BLD AUTO: 0.14 10*3/MM3 (ref 0–0.4)
EOSINOPHIL NFR BLD AUTO: 1.4 % (ref 0.3–6.2)
ERYTHROCYTE [DISTWIDTH] IN BLOOD BY AUTOMATED COUNT: 15.1 % (ref 12.3–15.4)
ETHANOL BLD-MCNC: 149 MG/DL (ref 0–10)
ETHANOL BLD-MCNC: 260 MG/DL (ref 0–10)
ETHANOL BLD-MCNC: 51 MG/DL (ref 0–10)
ETHANOL UR QL: 0.05 %
ETHANOL UR QL: 0.15 %
ETHANOL UR QL: 0.26 %
FENTANYL UR-MCNC: NEGATIVE NG/ML
FLUAV RNA RESP QL NAA+PROBE: NOT DETECTED
FLUBV RNA NPH QL NAA+NON-PROBE: NOT DETECTED
GLOBULIN UR ELPH-MCNC: 3.1 GM/DL
GLUCOSE SERPL-MCNC: 82 MG/DL (ref 65–99)
GLUCOSE UR STRIP-MCNC: NEGATIVE MG/DL
HCG INTACT+B SERPL-ACNC: <0.5 MIU/ML
HCT VFR BLD AUTO: 42.4 % (ref 34–46.6)
HGB BLD-MCNC: 14.7 G/DL (ref 12–15.9)
HGB UR QL STRIP.AUTO: ABNORMAL
HOLD SPECIMEN: NORMAL
HOLD SPECIMEN: NORMAL
HYALINE CASTS UR QL AUTO: ABNORMAL /LPF
IMM GRANULOCYTES # BLD AUTO: 0.06 10*3/MM3 (ref 0–0.05)
IMM GRANULOCYTES NFR BLD AUTO: 0.6 % (ref 0–0.5)
KETONES UR QL STRIP: ABNORMAL
LEUKOCYTE ESTERASE UR QL STRIP.AUTO: ABNORMAL
LYMPHOCYTES # BLD AUTO: 3.21 10*3/MM3 (ref 0.7–3.1)
LYMPHOCYTES NFR BLD AUTO: 31.1 % (ref 19.6–45.3)
MAGNESIUM SERPL-MCNC: 1.7 MG/DL (ref 1.6–2.6)
MCH RBC QN AUTO: 31.2 PG (ref 26.6–33)
MCHC RBC AUTO-ENTMCNC: 34.7 G/DL (ref 31.5–35.7)
MCV RBC AUTO: 90 FL (ref 79–97)
METHADONE UR QL SCN: NEGATIVE
MONOCYTES # BLD AUTO: 0.59 10*3/MM3 (ref 0.1–0.9)
MONOCYTES NFR BLD AUTO: 5.7 % (ref 5–12)
NEUTROPHILS NFR BLD AUTO: 6.23 10*3/MM3 (ref 1.7–7)
NEUTROPHILS NFR BLD AUTO: 60.4 % (ref 42.7–76)
NITRITE UR QL STRIP: POSITIVE
NRBC BLD AUTO-RTO: 0 /100 WBC (ref 0–0.2)
OPIATES UR QL: NEGATIVE
OXYCODONE UR QL SCN: NEGATIVE
PCP UR QL SCN: NEGATIVE
PH UR STRIP.AUTO: 5.5 [PH] (ref 5–8)
PLATELET # BLD AUTO: 375 10*3/MM3 (ref 140–450)
PMV BLD AUTO: 9.1 FL (ref 6–12)
POTASSIUM SERPL-SCNC: 3.8 MMOL/L (ref 3.5–5.2)
PROT SERPL-MCNC: 8.5 G/DL (ref 6–8.5)
PROT UR QL STRIP: ABNORMAL
RBC # BLD AUTO: 4.71 10*6/MM3 (ref 3.77–5.28)
RBC # UR STRIP: ABNORMAL /HPF
REF LAB TEST METHOD: ABNORMAL
RSV RNA RESP QL NAA+PROBE: NOT DETECTED
SALICYLATES SERPL-MCNC: <0.3 MG/DL
SARS-COV-2 RNA RESP QL NAA+PROBE: NOT DETECTED
SODIUM SERPL-SCNC: 145 MMOL/L (ref 136–145)
SP GR UR STRIP: 1.03 (ref 1–1.03)
SQUAMOUS #/AREA URNS HPF: ABNORMAL /HPF
TRICYCLICS UR QL SCN: NEGATIVE
TSH SERPL DL<=0.05 MIU/L-ACNC: 0.93 UIU/ML (ref 0.27–4.2)
UROBILINOGEN UR QL STRIP: ABNORMAL
WBC # UR STRIP: ABNORMAL /HPF
WBC CLUMPS # UR AUTO: ABNORMAL /HPF
WBC NRBC COR # BLD AUTO: 10.31 10*3/MM3 (ref 3.4–10.8)
WHOLE BLOOD HOLD COAG: NORMAL
WHOLE BLOOD HOLD SPECIMEN: NORMAL

## 2025-07-17 PROCEDURE — 80053 COMPREHEN METABOLIC PANEL: CPT | Performed by: EMERGENCY MEDICINE

## 2025-07-17 PROCEDURE — 80179 DRUG ASSAY SALICYLATE: CPT | Performed by: EMERGENCY MEDICINE

## 2025-07-17 PROCEDURE — 81001 URINALYSIS AUTO W/SCOPE: CPT | Performed by: EMERGENCY MEDICINE

## 2025-07-17 PROCEDURE — 36415 COLL VENOUS BLD VENIPUNCTURE: CPT

## 2025-07-17 PROCEDURE — 87637 SARSCOV2&INF A&B&RSV AMP PRB: CPT | Performed by: EMERGENCY MEDICINE

## 2025-07-17 PROCEDURE — 84443 ASSAY THYROID STIM HORMONE: CPT | Performed by: EMERGENCY MEDICINE

## 2025-07-17 PROCEDURE — 84702 CHORIONIC GONADOTROPIN TEST: CPT | Performed by: EMERGENCY MEDICINE

## 2025-07-17 PROCEDURE — 80143 DRUG ASSAY ACETAMINOPHEN: CPT | Performed by: EMERGENCY MEDICINE

## 2025-07-17 PROCEDURE — 82077 ASSAY SPEC XCP UR&BREATH IA: CPT | Performed by: EMERGENCY MEDICINE

## 2025-07-17 PROCEDURE — 80307 DRUG TEST PRSMV CHEM ANLYZR: CPT | Performed by: EMERGENCY MEDICINE

## 2025-07-17 PROCEDURE — 83735 ASSAY OF MAGNESIUM: CPT | Performed by: EMERGENCY MEDICINE

## 2025-07-17 PROCEDURE — 85025 COMPLETE CBC W/AUTO DIFF WBC: CPT | Performed by: EMERGENCY MEDICINE

## 2025-07-17 PROCEDURE — 99283 EMERGENCY DEPT VISIT LOW MDM: CPT

## 2025-07-17 RX ORDER — CEPHALEXIN 500 MG/1
500 CAPSULE ORAL 3 TIMES DAILY
Qty: 21 CAPSULE | Refills: 0 | Status: SHIPPED | OUTPATIENT
Start: 2025-07-17

## 2025-07-17 RX ORDER — SODIUM CHLORIDE 0.9 % (FLUSH) 0.9 %
10 SYRINGE (ML) INJECTION AS NEEDED
Status: DISCONTINUED | OUTPATIENT
Start: 2025-07-17 | End: 2025-07-18 | Stop reason: HOSPADM

## 2025-07-17 RX ORDER — DIAZEPAM 10 MG/2ML
5 INJECTION, SOLUTION INTRAMUSCULAR; INTRAVENOUS ONCE
Status: DISCONTINUED | OUTPATIENT
Start: 2025-07-17 | End: 2025-07-18 | Stop reason: HOSPADM

## 2025-07-17 NOTE — Clinical Note
[facility] EMERGENCY ROOM  [address] [place] [address]  Phone: [phone]  Fax: [phone]    Edwina Disla was seen and treated in our emergency department on 7/17/2025.  She may return to work on 07/18/2025.         Thank you for choosing [facility].    Megan Man RN

## 2025-07-18 LAB
QT INTERVAL: 575 MS
QTC INTERVAL: 464 MS

## 2025-07-18 PROCEDURE — 93005 ELECTROCARDIOGRAM TRACING: CPT

## 2025-07-18 PROCEDURE — 93010 ELECTROCARDIOGRAM REPORT: CPT | Performed by: INTERNAL MEDICINE

## 2025-07-18 NOTE — ED PROVIDER NOTES
Time: 9:44 PM EDT  Date of encounter:  2025  Independent Historian/Clinical History and Information was obtained by:   Patient    History is limited by: N/A    Chief Complaint: Alcohol intoxication      History of Present Illness:  Patient is a 25 y.o. year old female who presents to the emergency department for evaluation of alcohol intoxication.  The patient cut herself last night.  The patient states that she did it to feel something.  The patient denies suicidal ideation or plan currently.  Patient also denies homicidal ideation.  She states that she got into an argument with her significant other over custody of the child.      Patient Care Team  Primary Care Provider: Stephanie Billings APRN    Past Medical History:     No Known Allergies  Past Medical History:   Diagnosis Date    Abnormal Pap smear of cervix      Past Surgical History:   Procedure Laterality Date     SECTION       SECTION N/A 2025    Procedure:  SECTION REPEAT;  Surgeon: Alba Henao DO;  Location: [facility] LABOR DELIVERY;  Service: Gynecology;  Laterality: N/A;    DENTAL PROCEDURE       Family History   Problem Relation Age of Onset    Breast cancer Neg Hx     Uterine cancer Neg Hx     Colon cancer Neg Hx     Ovarian cancer Neg Hx     Melanoma Neg Hx     Prostate cancer Neg Hx        Home Medications:  Prior to Admission medications    Medication Sig Start Date End Date Taking? Authorizing Provider   Blood Glucose Monitoring Suppl (ONE TOUCH ULTRA 2) w/Device kit  25   ProviderPatrick MD   cephalexin (KEFLEX) 500 MG capsule Take 1 capsule by mouth 3 (Three) Times a Day. 25   Katarina Lin MD   clotrimazole (LOTRIMIN) 1 % vaginal cream Insert  into the vagina Every Night. Insert one applicator of cream into vagina nightly for 7 nights 25   Gregorio Marley APRN   docusate sodium (Colace) 100 MG capsule Take 1 capsule by mouth 2 (Two) Times a Day As Needed for Constipation.  5/25/25   Alba Henao DO   ibuprofen (ADVIL,MOTRIN) 800 MG tablet Take 1 tablet by mouth Every 8 (Eight) Hours As Needed for Mild Pain (alternate with tylenol). 5/25/25   Alba Henao DO   Lancets (OneTouch Delica Plus [id number]) misc  2/11/25   Patrick Mccauley MD   OneTouch Ultra Test test strip  2/11/25   Patrick Mccauley MD   oxyCODONE (ROXICODONE) 5 MG immediate release tablet Take 1 tablet by mouth Every 6 (Six) Hours As Needed for Severe Pain. 5/25/25   Alba Henao DO   pantoprazole (Protonix) 20 MG EC tablet Take 1 tablet by mouth Daily. 5/8/25 5/8/26  Ambreen Bai DO   prenatal vitamin (prenatal, CLASSIC, vitamin) tablet Take  by mouth Daily.    Patrick Mccauley MD   vitamin B-6 (PYRIDOXINE) 25 MG tablet Take 1 tablet by mouth Daily. 11/6/24   Alba Henao DO        Social History:   Social History     Tobacco Use    Smoking status: Some Days     Current packs/day: 1.00     Types: Cigarettes     Passive exposure: Current    Smokeless tobacco: Never   Vaping Use    Vaping status: Never Used   Substance Use Topics    Alcohol use: Not Currently     Comment: Occasional    Drug use: Never         Review of Systems:  Review of Systems   Constitutional:  Negative for chills and fever.   HENT:  Negative for congestion, rhinorrhea and sore throat.    Eyes:  Negative for pain and visual disturbance.   Respiratory:  Negative for apnea, cough, chest tightness and shortness of breath.    Cardiovascular:  Negative for chest pain and palpitations.   Gastrointestinal:  Negative for abdominal pain, diarrhea, nausea and vomiting.   Genitourinary:  Negative for difficulty urinating and dysuria.   Musculoskeletal:  Negative for joint swelling and myalgias.   Skin:  Negative for color change.   Neurological:  Negative for seizures and headaches.   Psychiatric/Behavioral: Negative.     All other systems reviewed and are negative.       Physical Exam:  /78   Pulse 88   Temp 97.6 °F (36.4 °C)  "(Oral)   Resp 16   Ht 160 cm (63\")   Wt 65.8 kg (145 lb)   LMP 08/20/2024 (Exact Date)   SpO2 98%   BMI 25.69 kg/m²     Physical Exam  Vitals and nursing note reviewed.   Constitutional:       General: She is not in acute distress.     Appearance: Normal appearance. She is not toxic-appearing.   HENT:      Head: Normocephalic and atraumatic.      Jaw: There is normal jaw occlusion.   Eyes:      General: Lids are normal.      Extraocular Movements: Extraocular movements intact.      Conjunctiva/sclera: Conjunctivae normal.      Pupils: Pupils are equal, round, and reactive to light.   Cardiovascular:      Rate and Rhythm: Normal rate and regular rhythm.      Pulses: Normal pulses.      Heart sounds: Normal heart sounds.   Pulmonary:      Effort: Pulmonary effort is normal. No respiratory distress.      Breath sounds: Normal breath sounds. No wheezing or rhonchi.   Abdominal:      General: Abdomen is flat.      Palpations: Abdomen is soft.      Tenderness: There is no abdominal tenderness. There is no guarding or rebound.   Musculoskeletal:         General: Normal range of motion.      Cervical back: Normal range of motion and neck supple.      Right lower leg: No edema.      Left lower leg: No edema.   Skin:     General: Skin is warm and dry.      Comments: (+) 3 cm laceration to the left wrist   Neurological:      Mental Status: She is alert and oriented to person, place, and time. Mental status is at baseline.   Psychiatric:         Mood and Affect: Mood normal.                    Medical Decision Making:      Comorbidities that affect care:    None    External Notes reviewed:    Hospital Discharge Summary: Patient was last discharged after delivery      The following orders were placed and all results were independently analyzed by me:  Orders Placed This Encounter   Procedures    COVID PRE-OP / PRE-PROCEDURE SCREENING ORDER (NO ISOLATION) - Swab, Nasopharynx    COVID-19, FLU A/B, RSV PCR 1 HR TAT - Swab, " Nasopharynx    [place] Draw    Comprehensive Metabolic Panel    Acetaminophen Level    Ethanol    Salicylate Level    TSH Rfx On Abnormal To Free T4    Urinalysis With Microscopic If Indicated (No Culture) - Urine, Clean Catch    Urine Drug Screen - Urine, Clean Catch    CBC Auto Differential    Magnesium    hCG, Quantitative, Pregnancy    Fentanyl, Urine - Urine, Clean Catch    Urinalysis, Microscopic Only - Urine, Clean Catch    Ethanol    Ethanol    NPO Diet NPO Type: Strict NPO    Vital Signs    Undress & Gown    Continuous Pulse Oximetry    Vital Signs    Undress & Gown    Psych / Access to See    Psych / Access to See    Oxygen Therapy- Nasal Cannula; Titrate 1-6 LPM Per SpO2; 90 - 95%    POC Glucose Once    ECG 12 Lead Other; SI    Insert Peripheral IV    Legal Status 72 Hour Hold    Suicide Precautions    CBC & Differential    Green Top (Gel)    Lavender Top    Gold Top - SST    Light Blue Top       Medications Given in the Emergency Department:  Medications   sodium chloride 0.9 % flush 10 mL (has no administration in time range)   sodium chloride 0.9 % bolus 1,000 mL (1,000 mL Intravenous Not Given 7/17/25 1821)   diazePAM (VALIUM) injection 5 mg (5 mg Intravenous Not Given 7/17/25 2030)        ED Course:         Labs:    Lab Results (last 24 hours)       Procedure Component Value Units Date/Time    Urinalysis With Microscopic If Indicated (No Culture) - Urine, Clean Catch [[id number]]  (Abnormal) Collected: 07/17/25 1436    Specimen: Urine, Clean Catch Updated: 07/17/25 1459     Color, UA Dark Yellow     Appearance, UA Turbid     pH, UA 5.5     Specific Gravity, UA 1.026     Glucose, UA Negative     Ketones, UA Trace     Bilirubin, UA Negative     Blood, UA Trace     Protein, UA >=300 mg/dL (3+)     Leuk Esterase, UA Moderate (2+)     Nitrite, UA Positive     Urobilinogen, UA 1.0 E.U./dL    Urine Drug Screen - Urine, Clean Catch [[id number]]  (Abnormal) Collected: 07/17/25 1436    Specimen: Urine, Clean  Catch Updated: 07/17/25 1516     THC, Screen, Urine Positive     Phencyclidine (PCP), Urine Negative     Cocaine Screen, Urine Negative     Methamphetamine, Ur Negative     Opiate Screen Negative     Amphetamine Screen, Urine Negative     Benzodiazepine Screen, Urine Negative     Tricyclic Antidepressants Screen Negative     Methadone Screen, Urine Negative     Barbiturates Screen, Urine Negative     Oxycodone Screen, Urine Negative     Buprenorphine, Screen, Urine Negative    Narrative:      Cutoff For Drugs Screened:    Amphetamines               500 ng/ml  Barbiturates               200 ng/ml  Benzodiazepines            150 ng/ml  Cocaine                    150 ng/ml  Methadone                  200 ng/ml  Opiates                    100 ng/ml  Phencyclidine               25 ng/ml  THC                         50 ng/ml  Methamphetamine            500 ng/ml  Tricyclic Antidepressants  300 ng/ml  Oxycodone                  100 ng/ml  Buprenorphine               10 ng/ml    The normal value for all drugs tested is negative. This report includes unconfirmed screening results, with the cutoff values listed, to be used for medical treatment purposes only.  Unconfirmed results must not be used for non-medical purposes such as employment or legal testing.  Clinical consideration should be applied to any drug of abuse test, particularly when unconfirmed results are used.      Fentanyl, Urine - Urine, Clean Catch [[id number]]  (Normal) Collected: 07/17/25 1436    Specimen: Urine, Clean Catch Updated: 07/17/25 1500     Fentanyl, Urine Negative    Narrative:      Negative Threshold:      Fentanyl 5 ng/mL     The normal value for the drug tested is negative. This report includes final unconfirmed screening results to be used for medical treatment purposes only. Unconfirmed results must not be used for non-medical purposes such as employment or legal testing. Clinical consideration should be applied to any drug of abuse test,  particularly when unconfirmed results are used.           Urinalysis, Microscopic Only - Urine, Clean Catch [[id number]]  (Abnormal) Collected: 07/17/25 1436    Specimen: Urine, Clean Catch Updated: 07/17/25 1522     RBC, UA 0-2 /HPF      WBC, UA Too Numerous to Count /HPF      Bacteria, UA 2+ /HPF      Squamous Epithelial Cells, UA 3-6 /HPF      Hyaline Casts, UA None Seen /LPF      WBC Clumps, UA Small/1+ /HPF      Methodology Manual Light Microscopy    CBC & Differential [[id number]]  (Abnormal) Collected: 07/17/25 1451    Specimen: Blood from Arm, Right Updated: 07/17/25 1502    Narrative:      The following orders were created for panel order CBC & Differential.  Procedure                               Abnormality         Status                     ---------                               -----------         ------                     CBC Auto Differential[[id number]]        Abnormal            Final result                 Please view results for these tests on the individual orders.    Comprehensive Metabolic Panel [[id number]]  (Abnormal) Collected: 07/17/25 1451    Specimen: Blood from Arm, Right Updated: 07/17/25 1519     Glucose 82 mg/dL      BUN 10.5 mg/dL      Creatinine 0.66 mg/dL      Sodium 145 mmol/L      Potassium 3.8 mmol/L      Chloride 109 mmol/L      CO2 17.4 mmol/L      Calcium 9.3 mg/dL      Total Protein 8.5 g/dL      Albumin 5.4 g/dL      ALT (SGPT) 21 U/L      AST (SGOT) 22 U/L      Alkaline Phosphatase 90 U/L      Total Bilirubin 0.3 mg/dL      Globulin 3.1 gm/dL      A/G Ratio 1.7 g/dL      BUN/Creatinine Ratio 15.9     Anion Gap 18.6 mmol/L      eGFR 125.0 mL/min/1.73     Narrative:      GFR Categories in Chronic Kidney Disease (CKD)              GFR Category          GFR (mL/min/1.73)    Interpretation  G1                    90 or greater        Normal or high (1)  G2                    60-89                Mild decrease (1)  G3a                   45-59                Mild to moderate  decrease  G3b                   30-44                Moderate to severe decrease  G4                    15-29                Severe decrease  G5                    14 or less           Kidney failure    (1)In the absence of evidence of kidney disease, neither GFR category G1 or G2 fulfill the criteria for CKD.    eGFR calculation 2021 CKD-EPI creatinine equation, which does not include race as a factor    Acetaminophen Level [[id number]]  (Normal) Collected: 07/17/25 1451    Specimen: Blood from Arm, Right Updated: 07/17/25 1519     Acetaminophen <5.0 mcg/mL     Ethanol [[id number]]  (Abnormal) Collected: 07/17/25 1451    Specimen: Blood from Arm, Right Updated: 07/17/25 1519     Ethanol 260 mg/dL      Ethanol % 0.260 %     Narrative:      Not for legal purposes.    Salicylate Level [[id number]]  (Normal) Collected: 07/17/25 1451    Specimen: Blood from Arm, Right Updated: 07/17/25 1519     Salicylate <0.3 mg/dL     TSH Rfx On Abnormal To Free T4 [[id number]]  (Normal) Collected: 07/17/25 1451    Specimen: Blood from Arm, Right Updated: 07/17/25 1531     TSH 0.929 uIU/mL     CBC Auto Differential [[id number]]  (Abnormal) Collected: 07/17/25 1451    Specimen: Blood from Arm, Right Updated: 07/17/25 1502     WBC 10.31 10*3/mm3      RBC 4.71 10*6/mm3      Hemoglobin 14.7 g/dL      Hematocrit 42.4 %      MCV 90.0 fL      MCH 31.2 pg      MCHC 34.7 g/dL      RDW 15.1 %      RDW-SD 50.2 fl      MPV 9.1 fL      Platelets 375 10*3/mm3      Neutrophil % 60.4 %      Lymphocyte % 31.1 %      Monocyte % 5.7 %      Eosinophil % 1.4 %      Basophil % 0.8 %      Immature Grans % 0.6 %      Neutrophils, Absolute 6.23 10*3/mm3      Lymphocytes, Absolute 3.21 10*3/mm3      Monocytes, Absolute 0.59 10*3/mm3      Eosinophils, Absolute 0.14 10*3/mm3      Basophils, Absolute 0.08 10*3/mm3      Immature Grans, Absolute 0.06 10*3/mm3      nRBC 0.0 /100 WBC     Magnesium [[id number]]  (Normal) Collected: 07/17/25 1451    Specimen: Blood from Arm,  Right Updated: 07/17/25 1519     Magnesium 1.7 mg/dL     hCG, Quantitative, Pregnancy [[id number]] Collected: 07/17/25 1451    Specimen: Blood from Arm, Right Updated: 07/17/25 1522     HCG Quantitative <0.50 mIU/mL     Narrative:      HCG Ranges by Gestational Age    Females - non-pregnant premenopausal   </= 1mIU/mL HCG  Females - postmenopausal               </= 7mIU/mL HCG    3 Weeks       5.4   -      72 mIU/mL  4 Weeks      10.2   -     708 mIU/mL  5 Weeks       217   -   8,245 mIU/mL  6 Weeks       152   -  32,177 mIU/mL  7 Weeks     4,059   - 153,767 mIU/mL  8 Weeks    31,366   - 149,094 mIU/mL  9 Weeks    59,109   - 135,901 mIU/mL  10 Weeks   44,186   - 170,409 mIU/mL  12 Weeks   27,107   - 201,615 mIU/mL  14 Weeks   24,302   -  93,646 mIU/mL  15 Weeks   12,540   -  69,747 mIU/mL  16 Weeks    8,904   -  55,332 mIU/mL  17 Weeks    8,240   -  51,793 mIU/mL  18 Weeks    9,649   -  55,271 mIU/mL      COVID PRE-OP / PRE-PROCEDURE SCREENING ORDER (NO ISOLATION) - Swab, Nasopharynx [[id number]]  (Normal) Collected: 07/17/25 1454    Specimen: Swab from Nasopharynx Updated: 07/17/25 1541    Narrative:      The following orders were created for panel order COVID PRE-OP / PRE-PROCEDURE SCREENING ORDER (NO ISOLATION) - Swab, Nasopharynx.  Procedure                               Abnormality         Status                     ---------                               -----------         ------                     COVID-19, FLU A/B, RSV P...[[id number]]  Normal              Final result                 Please view results for these tests on the individual orders.    COVID-19, FLU A/B, RSV PCR 1 HR TAT - Swab, Nasopharynx [[id number]]  (Normal) Collected: 07/17/25 1454    Specimen: Swab from Nasopharynx Updated: 07/17/25 1541     COVID19 Not Detected     Influenza A PCR Not Detected     Influenza B PCR Not Detected     RSV, PCR Not Detected    Ethanol [[id number]]  (Abnormal) Collected: 07/17/25 9242    Specimen: Blood Updated:  07/17/25 1909     Ethanol 149 mg/dL      Ethanol % 0.149 %     Narrative:      Not for legal purposes.    Ethanol [[id number]]  (Abnormal) Collected: 07/17/25 2112    Specimen: Blood Updated: 07/17/25 2137     Ethanol 51 mg/dL      Ethanol % 0.051 %     Narrative:      Not for legal purposes.             Imaging:    No Radiology Exams Resulted Within Past 24 Hours      Differential Diagnosis and Discussion:    Psychiatric: Differential diagnosis includes but is not limited to depression, psychosis, bipolar disorder, anxiety, manic episode, schizophrenia, and substance abuse.    PROCEDURES:    Labs were collected in the emergency department and all labs were reviewed and interpreted by me.    ECG 12 Lead Other; SI    (Results Pending)       Procedures    MDM     The patient´s CBC that was reviewed and interpreted by me shows no abnormalities of critical concern. Of note, there is no anemia requiring a blood transfusion and the platelet count is acceptable.  The patient´s CMP that was reviewed and interpretted by me shows no abnormalities of critical concern. Of note, the patient´s sodium and potassium are acceptable. The patient´s liver enzymes are unremarkable. The patient´s renal function (creatinine) is preserved. The patient has a normal anion gap.  Urinalysis shows 2+ bacteriuria.  Patient was reassessed and states that she does not feel suicidal and that she does have help at home.  The patient is cooperative, alert, talkative, interactive and in no distress. The patient's history, exam, diagnostic testing and current condition do not suggest an acute medical condition or other significant pathology that would warrant further testing, continued ED treatment, admission, neurological consultation, or other specialist evaluation. Based on my personal examination and observation, the patient is not acutely suicidal or homicidal and does not meet criteria for involuntary commitment. The patient is not a danger to  self or others at this time. The patient at this point seems reliable and has the insight and judgment necessary to be discharged from mental health or other appropriate follow-up. The vital signs have been stable. The patient's condition is stable and appropriate for discharge. The patient will pursue further outpatient evaluation and care as indicated in the discharge instructions.      The patient presented with a laceration in need of repair. The wound was irrigated with copious normal saline irrigation.  4 sutures were used to approximate the wound edges. Tetanus was not given. The patient tolerated the procedure well. Acute bleeding has ceased and the wound was approximated in the emergency department. Patient was counseled to keep the wound clean, dry, and out of the sun. Patient was counseled to change dressings daily. Patient was advised to return to the ED for worsening erythema, pain, swelling, fever, excessive drainage or signs of infection. They were counseled to follow up for suture removal as described in the discharge instructions. Patient verbalizes understanding and agrees to follow up as instructed.              Patient Care Considerations:    None      Consultants/Shared Management Plan:    None    Social Determinants of Health:    Patient is independent, reliable, and has access to care.       Disposition and Care Coordination:    Discharged: I considered escalation of care by admitting this patient to the hospital, however patient has no signs of arterial bleeding and is stable and suitable for discharge.    I have explained the patient´s condition, diagnoses and treatment plan based on the information available to me at this time. I have answered questions and addressed any concerns. The patient has a good  understanding of the patient´s diagnosis, condition, and treatment plan as can be expected at this point. The vital signs have been stable. The patient´s condition is stable and appropriate  for discharge from the emergency department.      The patient will pursue further outpatient evaluation with the primary care physician or other designated or consulting physician as outlined in the discharge instructions. They are agreeable to this plan of care and follow-up instructions have been explained in detail. The patient has received these instructions in written format and has expressed an understanding of the discharge instructions. The patient is aware that any significant change in condition or worsening of symptoms should prompt an immediate return to this or the closest emergency department or call to 911.  I have explained discharge medications and the need for follow up with the patient/caretakers. This was also printed in the discharge instructions. Patient was discharged with the following medications and follow up:      Medication List        New Prescriptions      cephalexin 500 MG capsule  Commonly known as: KEFLEX  Take 1 capsule by mouth 3 (Three) Times a Day.               Where to Get Your Medications        These medications were sent to [facility] [id number] - NERISSA BROCK - [address] [facility] [address] - [phone]  - [phone]   [address] [place] [address]      Phone: [phone]   cephalexin 500 MG capsule      Stephanie Billings, APRN  [address] [facility] [address]  [place] [address]  [phone]    In 2 days         Final diagnoses:   Alcoholic intoxication without complication        ED Disposition       ED Disposition   Discharge    Condition   Stable    Comment   --               This medical record created using voice recognition software.             Katarina Lin MD  07/17/25 5390

## 2025-07-18 NOTE — ED NOTES
Pt reports being told that she could leave at 2000. This RN, Briseyda, RN and Dr. Lin educated nurse that she could not leave without being evaluated after her ETOH decreased to below 80. Pt became visibly upset and started yelling and calling staff names. Security called and sitter outside of door.

## 2025-07-31 ENCOUNTER — POSTPARTUM VISIT (OUTPATIENT)
Dept: OBSTETRICS AND GYNECOLOGY | Age: 25
End: 2025-07-31
Payer: COMMERCIAL

## 2025-07-31 VITALS
HEART RATE: 64 BPM | SYSTOLIC BLOOD PRESSURE: 118 MMHG | BODY MASS INDEX: 24.62 KG/M2 | WEIGHT: 139 LBS | DIASTOLIC BLOOD PRESSURE: 79 MMHG

## 2025-07-31 RX ORDER — DESVENLAFAXINE 25 MG/1
TABLET, EXTENDED RELEASE ORAL
COMMUNITY
Start: 2025-07-28

## 2025-07-31 NOTE — PROGRESS NOTES
POSTPARTUM Follow Up Visit      Chief Complaint   Patient presents with    Postpartum Care     HPI:      Date of delivery: 2025  Delivery type:   LTCS            Delivering Provider:   Dr. Alba Henao      Feeding: Bottle  Pain:  No  Vaginal Bleeding:  No  Depressed/Anxious:  Yes, been referred to psychiatry by PCP to get started on medication  EPDS score: 13   #10: 0  Plans for BC: Undecided however was interested in Nexplanon.  Pamphlet given today.    Last pap date and result:   Last Completed Pap Smear    This patient has no relevant Health Maintenance data.              Postpartum Depression: High Risk (2025)    [place]  Depression Scale     Last EPDS Total Score: 13     Last EPDS Self Harm Result: Never       PHYSICAL EXAM:  /79   Pulse 64   Wt 63 kg (139 lb)   Breastfeeding No   BMI 24.62 kg/m²  Not found.  General- NAD, alert and oriented, appropriate  Psych- Normal mood, good memory, good eye contact    INCISION : Clean, dry, intact, no evidence of infection  Abdomen- Soft, non distended, non tender, no masses  Ext- No edema    ASSESSMENT AND PLAN:  Diagnoses and all orders for this visit:    1. Postpartum care following  delivery (Primary)      Counseling:    All birth control options reviewed in detail.  R/B/A/SE/E of each wrt pts PMHx and prior BC use  Ok to resume intercourse  May resume normal activities  Core strengthening exercises reviewed and recommended  Kegel exercises reviewed and recommended  Ok to return to work/school once patient desires/maternity leave completed  Abstinence until IUD/Nexplanon placed, UHCG day of OV, as long as still abstinent BHCG not needed        Follow Up:  No follow-ups on file.          Alba Henao DO  2025    [facility] [place] [id number]  [facility]  [facility] [place] [address]  Dept: [phone]  Dept Fax: [phone]  Loc: [phone]

## 2025-08-18 ENCOUNTER — OFFICE VISIT (OUTPATIENT)
Dept: BEHAVIORAL HEALTH | Facility: CLINIC | Age: 25
End: 2025-08-18
Payer: COMMERCIAL

## 2025-08-18 VITALS
DIASTOLIC BLOOD PRESSURE: 84 MMHG | SYSTOLIC BLOOD PRESSURE: 135 MMHG | WEIGHT: 142 LBS | HEART RATE: 71 BPM | HEIGHT: 63 IN | OXYGEN SATURATION: 99 % | BODY MASS INDEX: 25.16 KG/M2

## 2025-08-18 DIAGNOSIS — F51.01 PRIMARY INSOMNIA: ICD-10-CM

## 2025-08-18 DIAGNOSIS — F41.8 POSTPARTUM ANXIETY: ICD-10-CM

## 2025-08-18 RX ORDER — TRAZODONE HYDROCHLORIDE 50 MG/1
50 TABLET ORAL NIGHTLY
Qty: 30 TABLET | Refills: 1 | Status: SHIPPED | OUTPATIENT
Start: 2025-08-18

## 2025-08-18 RX ORDER — SERTRALINE HYDROCHLORIDE 25 MG/1
25 TABLET, FILM COATED ORAL DAILY
Qty: 30 TABLET | Refills: 2 | Status: SHIPPED | OUTPATIENT
Start: 2025-08-18 | End: 2026-08-18

## (undated) DEVICE — PENCL SMOKE/EVAC MEGADYNE TELESCP 10FT

## (undated) DEVICE — STERILE POLYISOPRENE POWDER-FREE SURGICAL GLOVES WITH EMOLLIENT COATING: Brand: PROTEXIS

## (undated) DEVICE — Device: Brand: PORTEX

## (undated) DEVICE — SUT PLAIN  3/0 CT1 27IN 842H

## (undated) DEVICE — SUT VIC 4/0 KS 27IN VCP662H

## (undated) DEVICE — DEV TRANSF BLD W/LUER ADPT CA/198

## (undated) DEVICE — NEEDLE,18GX1.5",REG,BEVEL: Brand: MEDLINE

## (undated) DEVICE — PREVENA INCISION MANAGEMENT SYSTEM- PEEL & PLACE DRESSING: Brand: PREVENA™ PEEL & PLACE™

## (undated) DEVICE — C SECTION PACK: Brand: MEDLINE INDUSTRIES, INC.

## (undated) DEVICE — TRY CATH FOL ADVANCE SIL W/BAG 16F

## (undated) DEVICE — INTENDED FOR TISSUE SEPARATION, AND OTHER PROCEDURES THAT REQUIRE A SHARP SURGICAL BLADE TO PUNCTURE OR CUT.: Brand: BARD-PARKER ® CARBON RIB-BACK BLADES

## (undated) DEVICE — CVR HNDL LT SURG ACCSSRY BLU STRL

## (undated) DEVICE — GLV SURG BIOGEL LTX PF 6 1/2

## (undated) DEVICE — THE STERILE LIGHT HANDLE COVER IS USED WITH STERIS SURGICAL LIGHTING AND VISUALIZATION SYSTEMS.

## (undated) DEVICE — SUT VIC 0 CTX 36IN J978H

## (undated) DEVICE — PAD GRND REM POLYHESIVE A/ DISP